# Patient Record
Sex: FEMALE | Race: WHITE | NOT HISPANIC OR LATINO | Employment: FULL TIME | URBAN - METROPOLITAN AREA
[De-identification: names, ages, dates, MRNs, and addresses within clinical notes are randomized per-mention and may not be internally consistent; named-entity substitution may affect disease eponyms.]

---

## 2017-02-07 ENCOUNTER — COMMUNICATION - HEALTHEAST (OUTPATIENT)
Dept: FAMILY MEDICINE | Facility: CLINIC | Age: 37
End: 2017-02-07

## 2017-02-07 DIAGNOSIS — F41.9 ANXIETY: ICD-10-CM

## 2017-03-03 ENCOUNTER — COMMUNICATION - HEALTHEAST (OUTPATIENT)
Dept: FAMILY MEDICINE | Facility: CLINIC | Age: 37
End: 2017-03-03

## 2017-03-06 ENCOUNTER — COMMUNICATION - HEALTHEAST (OUTPATIENT)
Dept: FAMILY MEDICINE | Facility: CLINIC | Age: 37
End: 2017-03-06

## 2017-03-06 DIAGNOSIS — F41.9 ANXIETY: ICD-10-CM

## 2017-03-06 DIAGNOSIS — F32.A DEPRESSION: ICD-10-CM

## 2017-03-06 DIAGNOSIS — Z30.9 CONTRACEPTION MANAGEMENT: ICD-10-CM

## 2017-04-10 ENCOUNTER — COMMUNICATION - HEALTHEAST (OUTPATIENT)
Dept: FAMILY MEDICINE | Facility: CLINIC | Age: 37
End: 2017-04-10

## 2017-04-10 DIAGNOSIS — F41.9 ANXIETY: ICD-10-CM

## 2017-05-26 ENCOUNTER — COMMUNICATION - HEALTHEAST (OUTPATIENT)
Dept: FAMILY MEDICINE | Facility: CLINIC | Age: 37
End: 2017-05-26

## 2017-05-26 DIAGNOSIS — F41.9 ANXIETY: ICD-10-CM

## 2017-06-15 ENCOUNTER — COMMUNICATION - HEALTHEAST (OUTPATIENT)
Dept: SURGERY | Facility: CLINIC | Age: 37
End: 2017-06-15

## 2017-06-15 ENCOUNTER — AMBULATORY - HEALTHEAST (OUTPATIENT)
Dept: SURGERY | Facility: CLINIC | Age: 37
End: 2017-06-15

## 2017-06-15 ENCOUNTER — COMMUNICATION - HEALTHEAST (OUTPATIENT)
Dept: FAMILY MEDICINE | Facility: CLINIC | Age: 37
End: 2017-06-15

## 2017-06-15 DIAGNOSIS — R63.2 HYPERPHAGIA: ICD-10-CM

## 2017-06-15 DIAGNOSIS — Z30.9 CONTRACEPTION MANAGEMENT: ICD-10-CM

## 2017-06-15 DIAGNOSIS — F32.A DEPRESSION: ICD-10-CM

## 2017-06-19 ENCOUNTER — COMMUNICATION - HEALTHEAST (OUTPATIENT)
Dept: FAMILY MEDICINE | Facility: CLINIC | Age: 37
End: 2017-06-19

## 2017-06-19 DIAGNOSIS — Z30.9 CONTRACEPTION MANAGEMENT: ICD-10-CM

## 2017-06-19 DIAGNOSIS — F32.A DEPRESSION: ICD-10-CM

## 2017-07-07 ENCOUNTER — COMMUNICATION - HEALTHEAST (OUTPATIENT)
Dept: FAMILY MEDICINE | Facility: CLINIC | Age: 37
End: 2017-07-07

## 2017-07-07 DIAGNOSIS — F41.9 ANXIETY: ICD-10-CM

## 2017-07-31 ENCOUNTER — OFFICE VISIT - HEALTHEAST (OUTPATIENT)
Dept: SURGERY | Facility: CLINIC | Age: 37
End: 2017-07-31

## 2017-07-31 DIAGNOSIS — K91.2 POSTOPERATIVE MALABSORPTION: ICD-10-CM

## 2017-07-31 DIAGNOSIS — R63.2 HYPERPHAGIA: ICD-10-CM

## 2017-07-31 DIAGNOSIS — R63.8 ABNORMAL CRAVING: ICD-10-CM

## 2017-07-31 DIAGNOSIS — Z98.84 HISTORY OF ROUX-EN-Y GASTRIC BYPASS: ICD-10-CM

## 2017-07-31 ASSESSMENT — MIFFLIN-ST. JEOR: SCORE: 1359.88

## 2017-08-15 ENCOUNTER — COMMUNICATION - HEALTHEAST (OUTPATIENT)
Dept: FAMILY MEDICINE | Facility: CLINIC | Age: 37
End: 2017-08-15

## 2017-08-15 DIAGNOSIS — F41.9 ANXIETY: ICD-10-CM

## 2017-08-30 ENCOUNTER — OFFICE VISIT - HEALTHEAST (OUTPATIENT)
Dept: FAMILY MEDICINE | Facility: CLINIC | Age: 37
End: 2017-08-30

## 2017-08-30 DIAGNOSIS — F33.41 RECURRENT MAJOR DEPRESSIVE DISORDER, IN PARTIAL REMISSION (H): ICD-10-CM

## 2017-08-30 DIAGNOSIS — F41.9 ANXIETY: ICD-10-CM

## 2017-08-30 DIAGNOSIS — F32.A DEPRESSION: ICD-10-CM

## 2017-08-30 DIAGNOSIS — K91.2 POSTOPERATIVE MALABSORPTION: ICD-10-CM

## 2017-08-30 DIAGNOSIS — Z98.84 HISTORY OF ROUX-EN-Y GASTRIC BYPASS: ICD-10-CM

## 2017-08-30 LAB
CHOLEST SERPL-MCNC: 181 MG/DL
FASTING STATUS PATIENT QL REPORTED: YES
HBA1C MFR BLD: 5.4 % (ref 3.5–6)
HDLC SERPL-MCNC: 71 MG/DL
LDLC SERPL CALC-MCNC: 100 MG/DL
TRIGL SERPL-MCNC: 52 MG/DL

## 2017-08-30 ASSESSMENT — MIFFLIN-ST. JEOR: SCORE: 1368.96

## 2017-09-01 ENCOUNTER — COMMUNICATION - HEALTHEAST (OUTPATIENT)
Dept: SURGERY | Facility: CLINIC | Age: 37
End: 2017-09-01

## 2017-09-22 ENCOUNTER — AMBULATORY - HEALTHEAST (OUTPATIENT)
Dept: SURGERY | Facility: CLINIC | Age: 37
End: 2017-09-22

## 2017-09-22 DIAGNOSIS — Z72.0 TOBACCO USE: ICD-10-CM

## 2017-11-24 ENCOUNTER — COMMUNICATION - HEALTHEAST (OUTPATIENT)
Dept: FAMILY MEDICINE | Facility: CLINIC | Age: 37
End: 2017-11-24

## 2018-01-09 ENCOUNTER — OFFICE VISIT - HEALTHEAST (OUTPATIENT)
Dept: FAMILY MEDICINE | Facility: CLINIC | Age: 38
End: 2018-01-09

## 2018-01-09 DIAGNOSIS — L73.9 FOLLICULITIS: ICD-10-CM

## 2018-01-09 DIAGNOSIS — J01.10 ACUTE NON-RECURRENT FRONTAL SINUSITIS: ICD-10-CM

## 2018-01-09 DIAGNOSIS — J01.00 ACUTE NON-RECURRENT MAXILLARY SINUSITIS: ICD-10-CM

## 2018-01-09 DIAGNOSIS — K64.4 EXTERNAL HEMORRHOID: ICD-10-CM

## 2018-01-09 ASSESSMENT — MIFFLIN-ST. JEOR: SCORE: 1387.1

## 2018-03-12 ENCOUNTER — OFFICE VISIT - HEALTHEAST (OUTPATIENT)
Dept: FAMILY MEDICINE | Facility: CLINIC | Age: 38
End: 2018-03-12

## 2018-03-12 DIAGNOSIS — H69.93 DYSFUNCTION OF BOTH EUSTACHIAN TUBES: ICD-10-CM

## 2018-04-26 ENCOUNTER — OFFICE VISIT - HEALTHEAST (OUTPATIENT)
Dept: OTOLARYNGOLOGY | Facility: CLINIC | Age: 38
End: 2018-04-26

## 2018-04-26 ENCOUNTER — OFFICE VISIT - HEALTHEAST (OUTPATIENT)
Dept: AUDIOLOGY | Facility: CLINIC | Age: 38
End: 2018-04-26

## 2018-04-26 DIAGNOSIS — G50.1 ATYPICAL FACIAL PAIN: ICD-10-CM

## 2018-04-26 DIAGNOSIS — H92.03 OTALGIA OF BOTH EARS: ICD-10-CM

## 2018-04-26 DIAGNOSIS — H93.8X3 SENSATION OF FULLNESS IN BOTH EARS: ICD-10-CM

## 2018-06-07 ENCOUNTER — COMMUNICATION - HEALTHEAST (OUTPATIENT)
Dept: FAMILY MEDICINE | Facility: CLINIC | Age: 38
End: 2018-06-07

## 2018-06-07 DIAGNOSIS — F41.9 ANXIETY: ICD-10-CM

## 2018-07-09 ENCOUNTER — OFFICE VISIT - HEALTHEAST (OUTPATIENT)
Dept: FAMILY MEDICINE | Facility: CLINIC | Age: 38
End: 2018-07-09

## 2018-07-09 DIAGNOSIS — F33.41 RECURRENT MAJOR DEPRESSIVE DISORDER, IN PARTIAL REMISSION (H): ICD-10-CM

## 2018-07-09 DIAGNOSIS — F41.1 ANXIETY STATE: ICD-10-CM

## 2018-07-09 ASSESSMENT — MIFFLIN-ST. JEOR: SCORE: 1350.81

## 2018-07-11 ENCOUNTER — COMMUNICATION - HEALTHEAST (OUTPATIENT)
Dept: PHARMACY | Facility: CLINIC | Age: 38
End: 2018-07-11

## 2018-08-13 ENCOUNTER — AMBULATORY - HEALTHEAST (OUTPATIENT)
Dept: SURGERY | Facility: CLINIC | Age: 38
End: 2018-08-13

## 2018-08-13 DIAGNOSIS — E78.5 DYSLIPIDEMIA: ICD-10-CM

## 2018-08-13 DIAGNOSIS — Z98.84 HISTORY OF ROUX-EN-Y GASTRIC BYPASS: ICD-10-CM

## 2018-08-13 DIAGNOSIS — K90.9 INTESTINAL MALABSORPTION: ICD-10-CM

## 2018-08-14 ENCOUNTER — OFFICE VISIT - HEALTHEAST (OUTPATIENT)
Dept: SURGERY | Facility: CLINIC | Age: 38
End: 2018-08-14

## 2018-08-14 DIAGNOSIS — R63.2 HYPERPHAGIA: ICD-10-CM

## 2018-08-14 DIAGNOSIS — K91.2 POSTOPERATIVE MALABSORPTION: ICD-10-CM

## 2018-08-14 DIAGNOSIS — R63.8 ABNORMAL CRAVING: ICD-10-CM

## 2018-08-14 DIAGNOSIS — Z72.0 TOBACCO USE: ICD-10-CM

## 2018-08-14 DIAGNOSIS — Z98.84 HISTORY OF ROUX-EN-Y GASTRIC BYPASS: ICD-10-CM

## 2018-08-14 RX ORDER — CETIRIZINE HYDROCHLORIDE 10 MG/1
20 TABLET ORAL DAILY
Status: SHIPPED | COMMUNITY
Start: 2018-08-14

## 2018-08-14 ASSESSMENT — MIFFLIN-ST. JEOR: SCORE: 1355.35

## 2018-09-10 ENCOUNTER — COMMUNICATION - HEALTHEAST (OUTPATIENT)
Dept: FAMILY MEDICINE | Facility: CLINIC | Age: 38
End: 2018-09-10

## 2018-09-10 DIAGNOSIS — F32.A DEPRESSION: ICD-10-CM

## 2018-09-11 RX ORDER — ALPRAZOLAM 1 MG
1 TABLET ORAL 3 TIMES DAILY PRN
Qty: 90 TABLET | Refills: 0 | Status: SHIPPED | OUTPATIENT
Start: 2018-09-11 | End: 2022-08-24

## 2018-09-28 ENCOUNTER — OFFICE VISIT - HEALTHEAST (OUTPATIENT)
Dept: FAMILY MEDICINE | Facility: CLINIC | Age: 38
End: 2018-09-28

## 2018-09-28 ENCOUNTER — AMBULATORY - HEALTHEAST (OUTPATIENT)
Dept: LAB | Facility: CLINIC | Age: 38
End: 2018-09-28

## 2018-09-28 DIAGNOSIS — E78.5 DYSLIPIDEMIA: ICD-10-CM

## 2018-09-28 DIAGNOSIS — K90.9 INTESTINAL MALABSORPTION: ICD-10-CM

## 2018-09-28 DIAGNOSIS — T07.XXXA MULTIPLE CONTUSIONS: ICD-10-CM

## 2018-09-28 DIAGNOSIS — T07.XXXA MUSCLE STRAIN, MULTIPLE SITES: ICD-10-CM

## 2018-09-28 DIAGNOSIS — Z98.84 HISTORY OF ROUX-EN-Y GASTRIC BYPASS: ICD-10-CM

## 2018-09-28 LAB
ALBUMIN SERPL-MCNC: 3.9 G/DL (ref 3.5–5)
ALP SERPL-CCNC: 136 U/L (ref 45–120)
ALT SERPL W P-5'-P-CCNC: <9 U/L (ref 0–45)
ANION GAP SERPL CALCULATED.3IONS-SCNC: 8 MMOL/L (ref 5–18)
AST SERPL W P-5'-P-CCNC: 17 U/L (ref 0–40)
BILIRUB SERPL-MCNC: 0.5 MG/DL (ref 0–1)
BUN SERPL-MCNC: 14 MG/DL (ref 8–22)
CALCIUM SERPL-MCNC: 9.5 MG/DL (ref 8.5–10.5)
CHLORIDE BLD-SCNC: 108 MMOL/L (ref 98–107)
CHOLEST SERPL-MCNC: 193 MG/DL
CO2 SERPL-SCNC: 25 MMOL/L (ref 22–31)
CREAT SERPL-MCNC: 0.64 MG/DL (ref 0.6–1.1)
ERYTHROCYTE [DISTWIDTH] IN BLOOD BY AUTOMATED COUNT: 12.1 % (ref 11–14.5)
FASTING STATUS PATIENT QL REPORTED: NO
FERRITIN SERPL-MCNC: 5 NG/ML (ref 10–130)
FOLATE SERPL-MCNC: >20 NG/ML
GFR SERPL CREATININE-BSD FRML MDRD: >60 ML/MIN/1.73M2
GLUCOSE BLD-MCNC: 74 MG/DL (ref 70–125)
HBA1C MFR BLD: 5.5 % (ref 3.5–6)
HCT VFR BLD AUTO: 36.3 % (ref 35–47)
HDLC SERPL-MCNC: 80 MG/DL
HGB BLD-MCNC: 12.4 G/DL (ref 12–16)
LDLC SERPL CALC-MCNC: 100 MG/DL
MCH RBC QN AUTO: 31 PG (ref 27–34)
MCHC RBC AUTO-ENTMCNC: 34.1 G/DL (ref 32–36)
MCV RBC AUTO: 91 FL (ref 80–100)
PLATELET # BLD AUTO: 437 THOU/UL (ref 140–440)
PMV BLD AUTO: 6.7 FL (ref 7–10)
POTASSIUM BLD-SCNC: 4.3 MMOL/L (ref 3.5–5)
PROT SERPL-MCNC: 6.7 G/DL (ref 6–8)
PTH-INTACT SERPL-MCNC: 66 PG/ML (ref 10–86)
RBC # BLD AUTO: 3.99 MILL/UL (ref 3.8–5.4)
SODIUM SERPL-SCNC: 141 MMOL/L (ref 136–145)
TRIGL SERPL-MCNC: 64 MG/DL
VIT B12 SERPL-MCNC: >2000 PG/ML (ref 213–816)
WBC: 6.1 THOU/UL (ref 4–11)

## 2018-09-28 ASSESSMENT — MIFFLIN-ST. JEOR: SCORE: 1355.35

## 2018-10-01 LAB
25(OH)D3 SERPL-MCNC: 24.5 NG/ML (ref 30–80)
ZINC SERPL-MCNC: 83 UG/DL (ref 60–120)

## 2018-10-02 LAB
ANNOTATION COMMENT IMP: NORMAL
VIT A SERPL-MCNC: 0.47 MG/L (ref 0.3–1.2)
VIT B1 PYROPHOSHATE BLD-SCNC: 128 NMOL/L (ref 70–180)
VITAMIN A (RETINYL PALMITATE): 0.02 MG/L (ref 0–0.1)

## 2018-10-07 ENCOUNTER — COMMUNICATION - HEALTHEAST (OUTPATIENT)
Dept: SURGERY | Facility: CLINIC | Age: 38
End: 2018-10-07

## 2018-11-12 ENCOUNTER — OFFICE VISIT - HEALTHEAST (OUTPATIENT)
Dept: FAMILY MEDICINE | Facility: CLINIC | Age: 38
End: 2018-11-12

## 2018-11-12 DIAGNOSIS — F90.0 ATTENTION DEFICIT HYPERACTIVITY DISORDER (ADHD), PREDOMINANTLY INATTENTIVE TYPE: ICD-10-CM

## 2018-11-12 DIAGNOSIS — F41.1 ANXIETY STATE: ICD-10-CM

## 2018-11-12 DIAGNOSIS — F33.41 RECURRENT MAJOR DEPRESSIVE DISORDER, IN PARTIAL REMISSION (H): ICD-10-CM

## 2018-11-12 ASSESSMENT — MIFFLIN-ST. JEOR: SCORE: 1291.84

## 2018-12-04 ENCOUNTER — COMMUNICATION - HEALTHEAST (OUTPATIENT)
Dept: FAMILY MEDICINE | Facility: CLINIC | Age: 38
End: 2018-12-04

## 2018-12-17 ENCOUNTER — AMBULATORY - HEALTHEAST (OUTPATIENT)
Dept: FAMILY MEDICINE | Facility: CLINIC | Age: 38
End: 2018-12-17

## 2019-01-17 ENCOUNTER — COMMUNICATION - HEALTHEAST (OUTPATIENT)
Dept: FAMILY MEDICINE | Facility: CLINIC | Age: 39
End: 2019-01-17

## 2019-02-21 ENCOUNTER — OFFICE VISIT - HEALTHEAST (OUTPATIENT)
Dept: FAMILY MEDICINE | Facility: CLINIC | Age: 39
End: 2019-02-21

## 2019-02-21 DIAGNOSIS — F33.41 RECURRENT MAJOR DEPRESSIVE DISORDER, IN PARTIAL REMISSION (H): ICD-10-CM

## 2019-02-21 DIAGNOSIS — A60.00 GENITAL HERPES SIMPLEX, UNSPECIFIED SITE: ICD-10-CM

## 2019-02-21 DIAGNOSIS — F41.1 ANXIETY STATE: ICD-10-CM

## 2019-02-21 DIAGNOSIS — Z76.89 ESTABLISHING CARE WITH NEW DOCTOR, ENCOUNTER FOR: ICD-10-CM

## 2019-02-21 ASSESSMENT — MIFFLIN-ST. JEOR: SCORE: 1355.35

## 2019-04-04 ENCOUNTER — OFFICE VISIT - HEALTHEAST (OUTPATIENT)
Dept: SURGERY | Facility: CLINIC | Age: 39
End: 2019-04-04

## 2019-04-04 DIAGNOSIS — R63.2 HYPERPHAGIA: ICD-10-CM

## 2019-04-04 DIAGNOSIS — Z72.0 TOBACCO USE: ICD-10-CM

## 2019-04-04 DIAGNOSIS — K91.2 POSTOPERATIVE MALABSORPTION: ICD-10-CM

## 2019-04-04 DIAGNOSIS — R63.8 ABNORMAL CRAVING: ICD-10-CM

## 2019-04-04 ASSESSMENT — MIFFLIN-ST. JEOR: SCORE: 1359.88

## 2019-04-10 ENCOUNTER — COMMUNICATION - HEALTHEAST (OUTPATIENT)
Dept: FAMILY MEDICINE | Facility: CLINIC | Age: 39
End: 2019-04-10

## 2019-04-16 ENCOUNTER — OFFICE VISIT - HEALTHEAST (OUTPATIENT)
Dept: FAMILY MEDICINE | Facility: CLINIC | Age: 39
End: 2019-04-16

## 2019-04-16 DIAGNOSIS — Z12.4 SCREENING FOR MALIGNANT NEOPLASM OF CERVIX: ICD-10-CM

## 2019-04-16 DIAGNOSIS — N76.0 ACUTE VAGINITIS: ICD-10-CM

## 2019-04-16 LAB
CLUE CELLS: NORMAL
TRICHOMONAS, WET PREP: NORMAL
YEAST, WET PREP: NORMAL

## 2019-04-16 ASSESSMENT — MIFFLIN-ST. JEOR: SCORE: 1364.42

## 2019-04-17 ENCOUNTER — AMBULATORY - HEALTHEAST (OUTPATIENT)
Dept: FAMILY MEDICINE | Facility: CLINIC | Age: 39
End: 2019-04-17

## 2019-04-17 DIAGNOSIS — N76.0 ACUTE VAGINITIS: ICD-10-CM

## 2019-04-17 LAB
C TRACH DNA SPEC QL PROBE+SIG AMP: NEGATIVE
HPV SOURCE: NORMAL
HUMAN PAPILLOMA VIRUS 16 DNA: NEGATIVE
HUMAN PAPILLOMA VIRUS 18 DNA: NEGATIVE
HUMAN PAPILLOMA VIRUS FINAL DIAGNOSIS: NORMAL
HUMAN PAPILLOMA VIRUS OTHER HR: NEGATIVE
N GONORRHOEA DNA SPEC QL NAA+PROBE: NEGATIVE
SPECIMEN DESCRIPTION: NORMAL

## 2019-04-23 ENCOUNTER — COMMUNICATION - HEALTHEAST (OUTPATIENT)
Dept: FAMILY MEDICINE | Facility: CLINIC | Age: 39
End: 2019-04-23

## 2019-04-23 LAB
BKR LAB AP ABNORMAL BLEEDING: NO
BKR LAB AP BIRTH CONTROL/HORMONES: NORMAL
BKR LAB AP CERVICAL APPEARANCE: NORMAL
BKR LAB AP GYN ADEQUACY: NORMAL
BKR LAB AP GYN INTERPRETATION: NORMAL
BKR LAB AP HPV REFLEX: NORMAL
BKR LAB AP LMP: NORMAL
BKR LAB AP PATIENT STATUS: NORMAL
BKR LAB AP PREVIOUS ABNORMAL: NORMAL
BKR LAB AP PREVIOUS NORMAL: 2016
HIGH RISK?: NO
PATH REPORT.COMMENTS IMP SPEC: NORMAL
RESULT FLAG (HE HISTORICAL CONVERSION): NORMAL

## 2019-06-13 ENCOUNTER — COMMUNICATION - HEALTHEAST (OUTPATIENT)
Dept: FAMILY MEDICINE | Facility: CLINIC | Age: 39
End: 2019-06-13

## 2019-06-13 DIAGNOSIS — N76.0 ACUTE VAGINITIS: ICD-10-CM

## 2019-06-14 ENCOUNTER — COMMUNICATION - HEALTHEAST (OUTPATIENT)
Dept: FAMILY MEDICINE | Facility: CLINIC | Age: 39
End: 2019-06-14

## 2019-06-14 DIAGNOSIS — F32.A DEPRESSION: ICD-10-CM

## 2019-07-25 ENCOUNTER — COMMUNICATION - HEALTHEAST (OUTPATIENT)
Dept: FAMILY MEDICINE | Facility: CLINIC | Age: 39
End: 2019-07-25

## 2019-07-25 DIAGNOSIS — N76.0 ACUTE VAGINITIS: ICD-10-CM

## 2019-08-26 ENCOUNTER — COMMUNICATION - HEALTHEAST (OUTPATIENT)
Dept: SURGERY | Facility: CLINIC | Age: 39
End: 2019-08-26

## 2019-08-26 DIAGNOSIS — Z98.84 HISTORY OF ROUX-EN-Y GASTRIC BYPASS: ICD-10-CM

## 2019-09-04 ENCOUNTER — OFFICE VISIT - HEALTHEAST (OUTPATIENT)
Dept: FAMILY MEDICINE | Facility: CLINIC | Age: 39
End: 2019-09-04

## 2019-09-04 DIAGNOSIS — H00.14 CHALAZION OF LEFT UPPER EYELID: ICD-10-CM

## 2019-09-04 ASSESSMENT — MIFFLIN-ST. JEOR: SCORE: 1384.83

## 2019-09-05 ENCOUNTER — COMMUNICATION - HEALTHEAST (OUTPATIENT)
Dept: FAMILY MEDICINE | Facility: CLINIC | Age: 39
End: 2019-09-05

## 2019-09-05 DIAGNOSIS — F41.9 ANXIETY: ICD-10-CM

## 2019-09-05 DIAGNOSIS — B37.31 YEAST VAGINITIS: ICD-10-CM

## 2019-09-05 DIAGNOSIS — F41.1 ANXIETY STATE: ICD-10-CM

## 2019-09-16 ENCOUNTER — COMMUNICATION - HEALTHEAST (OUTPATIENT)
Dept: FAMILY MEDICINE | Facility: CLINIC | Age: 39
End: 2019-09-16

## 2019-09-16 DIAGNOSIS — N76.0 ACUTE VAGINITIS: ICD-10-CM

## 2019-10-06 ENCOUNTER — COMMUNICATION - HEALTHEAST (OUTPATIENT)
Dept: FAMILY MEDICINE | Facility: CLINIC | Age: 39
End: 2019-10-06

## 2019-10-06 DIAGNOSIS — F41.9 ANXIETY: ICD-10-CM

## 2019-10-07 RX ORDER — HYDROXYZINE HYDROCHLORIDE 25 MG/1
25 TABLET, FILM COATED ORAL DAILY PRN
Qty: 90 TABLET | Refills: 3 | Status: SHIPPED | OUTPATIENT
Start: 2019-10-07 | End: 2023-11-02

## 2019-10-31 ENCOUNTER — OFFICE VISIT - HEALTHEAST (OUTPATIENT)
Dept: SURGERY | Facility: CLINIC | Age: 39
End: 2019-10-31

## 2019-10-31 ENCOUNTER — AMBULATORY - HEALTHEAST (OUTPATIENT)
Dept: LAB | Facility: CLINIC | Age: 39
End: 2019-10-31

## 2019-10-31 DIAGNOSIS — R63.8 ABNORMAL CRAVING: ICD-10-CM

## 2019-10-31 DIAGNOSIS — K91.2 POSTOPERATIVE MALABSORPTION: ICD-10-CM

## 2019-10-31 DIAGNOSIS — Z98.84 HISTORY OF ROUX-EN-Y GASTRIC BYPASS: ICD-10-CM

## 2019-10-31 DIAGNOSIS — R63.2 HYPERPHAGIA: ICD-10-CM

## 2019-10-31 LAB
ALBUMIN SERPL-MCNC: 3.9 G/DL (ref 3.5–5)
ALP SERPL-CCNC: 152 U/L (ref 45–120)
ALT SERPL W P-5'-P-CCNC: 15 U/L (ref 0–45)
ANION GAP SERPL CALCULATED.3IONS-SCNC: 11 MMOL/L (ref 5–18)
AST SERPL W P-5'-P-CCNC: 33 U/L (ref 0–40)
BILIRUB SERPL-MCNC: 0.3 MG/DL (ref 0–1)
BUN SERPL-MCNC: 11 MG/DL (ref 8–22)
CALCIUM SERPL-MCNC: 9.6 MG/DL (ref 8.5–10.5)
CHLORIDE BLD-SCNC: 107 MMOL/L (ref 98–107)
CO2 SERPL-SCNC: 23 MMOL/L (ref 22–31)
CREAT SERPL-MCNC: 0.7 MG/DL (ref 0.6–1.1)
ERYTHROCYTE [DISTWIDTH] IN BLOOD BY AUTOMATED COUNT: 14.8 % (ref 11–14.5)
FERRITIN SERPL-MCNC: 4 NG/ML (ref 10–130)
FOLATE SERPL-MCNC: >20 NG/ML
GFR SERPL CREATININE-BSD FRML MDRD: >60 ML/MIN/1.73M2
GLUCOSE BLD-MCNC: 85 MG/DL (ref 70–125)
HBA1C MFR BLD: 5.5 % (ref 3.5–6)
HCT VFR BLD AUTO: 29.1 % (ref 35–47)
HGB BLD-MCNC: 9.7 G/DL (ref 12–16)
MCH RBC QN AUTO: 25.3 PG (ref 27–34)
MCHC RBC AUTO-ENTMCNC: 33.1 G/DL (ref 32–36)
MCV RBC AUTO: 76 FL (ref 80–100)
PLATELET # BLD AUTO: 583 THOU/UL (ref 140–440)
PMV BLD AUTO: 6.4 FL (ref 7–10)
POTASSIUM BLD-SCNC: 4.5 MMOL/L (ref 3.5–5)
PROT SERPL-MCNC: 7 G/DL (ref 6–8)
PTH-INTACT SERPL-MCNC: 103 PG/ML (ref 10–86)
RBC # BLD AUTO: 3.81 MILL/UL (ref 3.8–5.4)
SODIUM SERPL-SCNC: 141 MMOL/L (ref 136–145)
VIT B12 SERPL-MCNC: >2000 PG/ML (ref 213–816)
WBC: 6.2 THOU/UL (ref 4–11)

## 2019-10-31 RX ORDER — NALTREXONE HYDROCHLORIDE 50 MG/1
25 TABLET, FILM COATED ORAL 2 TIMES DAILY
Qty: 90 TABLET | Status: SHIPPED | OUTPATIENT
Start: 2019-10-31 | End: 2022-08-24

## 2019-10-31 ASSESSMENT — MIFFLIN-ST. JEOR: SCORE: 1387.1

## 2019-11-01 LAB — 25(OH)D3 SERPL-MCNC: 16 NG/ML (ref 30–80)

## 2019-11-03 LAB
ANNOTATION COMMENT IMP: NORMAL
VIT A SERPL-MCNC: 0.49 MG/L (ref 0.3–1.2)
VITAMIN A (RETINYL PALMITATE): 0.03 MG/L (ref 0–0.1)
ZINC SERPL-MCNC: 75.3 UG/DL (ref 60–120)

## 2019-11-05 ENCOUNTER — COMMUNICATION - HEALTHEAST (OUTPATIENT)
Dept: SURGERY | Facility: CLINIC | Age: 39
End: 2019-11-05

## 2019-11-06 LAB — VIT B1 PYROPHOSHATE BLD-SCNC: 92 NMOL/L (ref 70–180)

## 2019-11-08 ENCOUNTER — COMMUNICATION - HEALTHEAST (OUTPATIENT)
Dept: FAMILY MEDICINE | Facility: CLINIC | Age: 39
End: 2019-11-08

## 2019-11-08 DIAGNOSIS — F41.1 ANXIETY STATE: ICD-10-CM

## 2019-11-12 ENCOUNTER — COMMUNICATION - HEALTHEAST (OUTPATIENT)
Dept: SURGERY | Facility: CLINIC | Age: 39
End: 2019-11-12

## 2020-02-12 ENCOUNTER — COMMUNICATION - HEALTHEAST (OUTPATIENT)
Dept: FAMILY MEDICINE | Facility: CLINIC | Age: 40
End: 2020-02-12

## 2020-04-08 ENCOUNTER — COMMUNICATION - HEALTHEAST (OUTPATIENT)
Dept: SURGERY | Facility: CLINIC | Age: 40
End: 2020-04-08

## 2020-04-08 DIAGNOSIS — K90.9 INTESTINAL MALABSORPTION: ICD-10-CM

## 2020-04-08 DIAGNOSIS — D64.9 ANEMIA: ICD-10-CM

## 2020-04-13 ENCOUNTER — COMMUNICATION - HEALTHEAST (OUTPATIENT)
Dept: ADMINISTRATIVE | Facility: CLINIC | Age: 40
End: 2020-04-13

## 2020-04-14 ENCOUNTER — COMMUNICATION - HEALTHEAST (OUTPATIENT)
Dept: SURGERY | Facility: CLINIC | Age: 40
End: 2020-04-14

## 2020-04-14 DIAGNOSIS — E55.9 VITAMIN D DEFICIENCY: ICD-10-CM

## 2020-04-14 DIAGNOSIS — Z98.84 HISTORY OF ROUX-EN-Y GASTRIC BYPASS: ICD-10-CM

## 2020-04-14 DIAGNOSIS — R79.89 ELEVATED PARATHYROID HORMONE: ICD-10-CM

## 2020-04-14 DIAGNOSIS — K91.2 POSTOPERATIVE MALABSORPTION: ICD-10-CM

## 2020-04-14 DIAGNOSIS — E56.9 VITAMIN DEFICIENCY: ICD-10-CM

## 2020-04-14 DIAGNOSIS — R79.0 LOW FERRITIN: ICD-10-CM

## 2020-04-16 ENCOUNTER — AMBULATORY - HEALTHEAST (OUTPATIENT)
Dept: LAB | Facility: CLINIC | Age: 40
End: 2020-04-16

## 2020-04-16 DIAGNOSIS — R79.0 LOW FERRITIN: ICD-10-CM

## 2020-04-16 DIAGNOSIS — K91.2 POSTOPERATIVE MALABSORPTION: ICD-10-CM

## 2020-04-16 DIAGNOSIS — Z98.84 HISTORY OF ROUX-EN-Y GASTRIC BYPASS: ICD-10-CM

## 2020-04-16 DIAGNOSIS — R79.89 ELEVATED PARATHYROID HORMONE: ICD-10-CM

## 2020-04-16 DIAGNOSIS — E55.9 VITAMIN D DEFICIENCY: ICD-10-CM

## 2020-04-16 LAB
ALBUMIN SERPL-MCNC: 4 G/DL (ref 3.5–5)
ALP SERPL-CCNC: 113 U/L (ref 45–120)
ALT SERPL W P-5'-P-CCNC: 12 U/L (ref 0–45)
ANION GAP SERPL CALCULATED.3IONS-SCNC: 12 MMOL/L (ref 5–18)
AST SERPL W P-5'-P-CCNC: 21 U/L (ref 0–40)
BILIRUB SERPL-MCNC: 1 MG/DL (ref 0–1)
BUN SERPL-MCNC: 10 MG/DL (ref 8–22)
CALCIUM SERPL-MCNC: 9.4 MG/DL (ref 8.5–10.5)
CHLORIDE BLD-SCNC: 107 MMOL/L (ref 98–107)
CO2 SERPL-SCNC: 22 MMOL/L (ref 22–31)
CREAT SERPL-MCNC: 0.66 MG/DL (ref 0.6–1.1)
ERYTHROCYTE [DISTWIDTH] IN BLOOD BY AUTOMATED COUNT: 11.1 % (ref 11–14.5)
FERRITIN SERPL-MCNC: 28 NG/ML (ref 10–130)
GFR SERPL CREATININE-BSD FRML MDRD: >60 ML/MIN/1.73M2
GLUCOSE BLD-MCNC: 84 MG/DL (ref 70–125)
HCT VFR BLD AUTO: 41.3 % (ref 35–47)
HGB BLD-MCNC: 14.2 G/DL (ref 12–16)
MCH RBC QN AUTO: 34.2 PG (ref 27–34)
MCHC RBC AUTO-ENTMCNC: 34.3 G/DL (ref 32–36)
MCV RBC AUTO: 100 FL (ref 80–100)
PLATELET # BLD AUTO: 429 THOU/UL (ref 140–440)
PMV BLD AUTO: 7 FL (ref 7–10)
POTASSIUM BLD-SCNC: 3.8 MMOL/L (ref 3.5–5)
PROT SERPL-MCNC: 6.8 G/DL (ref 6–8)
PTH-INTACT SERPL-MCNC: 44 PG/ML (ref 10–86)
RBC # BLD AUTO: 4.14 MILL/UL (ref 3.8–5.4)
SODIUM SERPL-SCNC: 141 MMOL/L (ref 136–145)
WBC: 7 THOU/UL (ref 4–11)

## 2020-04-17 ENCOUNTER — COMMUNICATION - HEALTHEAST (OUTPATIENT)
Dept: SURGERY | Facility: CLINIC | Age: 40
End: 2020-04-17

## 2020-04-17 LAB — 25(OH)D3 SERPL-MCNC: 39.4 NG/ML (ref 30–80)

## 2020-04-23 ENCOUNTER — COMMUNICATION - HEALTHEAST (OUTPATIENT)
Dept: SURGERY | Facility: CLINIC | Age: 40
End: 2020-04-23

## 2020-04-23 ENCOUNTER — OFFICE VISIT - HEALTHEAST (OUTPATIENT)
Dept: SURGERY | Facility: CLINIC | Age: 40
End: 2020-04-23

## 2020-04-23 DIAGNOSIS — K91.2 POSTOPERATIVE MALABSORPTION: ICD-10-CM

## 2020-04-23 DIAGNOSIS — Z72.0 TOBACCO USE: ICD-10-CM

## 2020-04-23 RX ORDER — FERROUS SULFATE 325(65) MG
1 TABLET ORAL
Status: SHIPPED | COMMUNITY
Start: 2020-04-23

## 2020-04-23 ASSESSMENT — MIFFLIN-ST. JEOR: SCORE: 1409.78

## 2020-05-26 ENCOUNTER — COMMUNICATION - HEALTHEAST (OUTPATIENT)
Dept: FAMILY MEDICINE | Facility: CLINIC | Age: 40
End: 2020-05-26

## 2020-05-26 DIAGNOSIS — F41.1 ANXIETY STATE: ICD-10-CM

## 2020-05-26 RX ORDER — VENLAFAXINE HYDROCHLORIDE 150 MG/1
1 TABLET, EXTENDED RELEASE ORAL DAILY
Qty: 90 EACH | Refills: 3 | Status: SHIPPED | OUTPATIENT
Start: 2020-05-26 | End: 2022-08-24

## 2020-06-16 ENCOUNTER — AMBULATORY - HEALTHEAST (OUTPATIENT)
Dept: SURGERY | Facility: CLINIC | Age: 40
End: 2020-06-16

## 2020-06-16 ENCOUNTER — COMMUNICATION - HEALTHEAST (OUTPATIENT)
Dept: SURGERY | Facility: CLINIC | Age: 40
End: 2020-06-16

## 2020-06-16 DIAGNOSIS — R63.2 HYPERPHAGIA: ICD-10-CM

## 2020-06-16 RX ORDER — PHENTERMINE HYDROCHLORIDE 37.5 MG/1
TABLET ORAL
Qty: 90 TABLET | Refills: 1 | Status: SHIPPED | OUTPATIENT
Start: 2020-06-16 | End: 2022-08-24

## 2020-06-17 ENCOUNTER — COMMUNICATION - HEALTHEAST (OUTPATIENT)
Dept: FAMILY MEDICINE | Facility: CLINIC | Age: 40
End: 2020-06-17

## 2020-06-17 DIAGNOSIS — F32.A DEPRESSION: ICD-10-CM

## 2020-06-23 ENCOUNTER — COMMUNICATION - HEALTHEAST (OUTPATIENT)
Dept: FAMILY MEDICINE | Facility: CLINIC | Age: 40
End: 2020-06-23

## 2020-06-29 ENCOUNTER — COMMUNICATION - HEALTHEAST (OUTPATIENT)
Dept: FAMILY MEDICINE | Facility: CLINIC | Age: 40
End: 2020-06-29

## 2020-06-29 DIAGNOSIS — F32.A DEPRESSION: ICD-10-CM

## 2020-06-29 RX ORDER — BUSPIRONE HYDROCHLORIDE 15 MG/1
15 TABLET ORAL 2 TIMES DAILY
Qty: 15 TABLET | Refills: 0 | Status: SHIPPED | OUTPATIENT
Start: 2020-06-29 | End: 2022-07-13

## 2020-07-24 ENCOUNTER — OFFICE VISIT - HEALTHEAST (OUTPATIENT)
Dept: FAMILY MEDICINE | Facility: CLINIC | Age: 40
End: 2020-07-24

## 2020-07-24 ENCOUNTER — COMMUNICATION - HEALTHEAST (OUTPATIENT)
Dept: FAMILY MEDICINE | Facility: CLINIC | Age: 40
End: 2020-07-24

## 2020-07-24 DIAGNOSIS — N76.0 BACTERIAL VAGINOSIS: ICD-10-CM

## 2020-07-24 DIAGNOSIS — B96.89 BACTERIAL VAGINOSIS: ICD-10-CM

## 2020-07-24 DIAGNOSIS — Z11.3 SCREEN FOR STD (SEXUALLY TRANSMITTED DISEASE): ICD-10-CM

## 2020-07-24 LAB
CLUE CELLS: ABNORMAL
HIV 1+2 AB+HIV1 P24 AG SERPL QL IA: NEGATIVE
TRICHOMONAS, WET PREP: ABNORMAL
YEAST, WET PREP: ABNORMAL

## 2020-07-24 ASSESSMENT — MIFFLIN-ST. JEOR: SCORE: 1405.7

## 2020-07-25 LAB — T PALLIDUM AB SER QL: NEGATIVE

## 2020-07-28 LAB
C TRACH DNA SPEC QL PROBE+SIG AMP: NEGATIVE
N GONORRHOEA DNA SPEC QL NAA+PROBE: NEGATIVE

## 2020-07-29 ENCOUNTER — COMMUNICATION - HEALTHEAST (OUTPATIENT)
Dept: FAMILY MEDICINE | Facility: CLINIC | Age: 40
End: 2020-07-29

## 2020-09-17 ENCOUNTER — OFFICE VISIT - HEALTHEAST (OUTPATIENT)
Dept: SURGERY | Facility: CLINIC | Age: 40
End: 2020-09-17

## 2020-09-17 ENCOUNTER — AMBULATORY - HEALTHEAST (OUTPATIENT)
Dept: LAB | Facility: CLINIC | Age: 40
End: 2020-09-17

## 2020-09-17 DIAGNOSIS — K91.2 POSTOPERATIVE MALABSORPTION: ICD-10-CM

## 2020-09-17 LAB
ALBUMIN SERPL-MCNC: 4.2 G/DL (ref 3.5–5)
ALP SERPL-CCNC: 130 U/L (ref 45–120)
ALT SERPL W P-5'-P-CCNC: <9 U/L (ref 0–45)
ANION GAP SERPL CALCULATED.3IONS-SCNC: 10 MMOL/L (ref 5–18)
AST SERPL W P-5'-P-CCNC: 23 U/L (ref 0–40)
BILIRUB SERPL-MCNC: 0.2 MG/DL (ref 0–1)
BUN SERPL-MCNC: 9 MG/DL (ref 8–22)
CALCIUM SERPL-MCNC: 9.1 MG/DL (ref 8.5–10.5)
CHLORIDE BLD-SCNC: 105 MMOL/L (ref 98–107)
CO2 SERPL-SCNC: 25 MMOL/L (ref 22–31)
CREAT SERPL-MCNC: 0.71 MG/DL (ref 0.6–1.1)
ERYTHROCYTE [DISTWIDTH] IN BLOOD BY AUTOMATED COUNT: 10.3 % (ref 11–14.5)
FERRITIN SERPL-MCNC: 54 NG/ML (ref 10–130)
FOLATE SERPL-MCNC: >20 NG/ML
GFR SERPL CREATININE-BSD FRML MDRD: >60 ML/MIN/1.73M2
GLUCOSE BLD-MCNC: 117 MG/DL (ref 70–125)
HBA1C MFR BLD: 4.8 %
HCT VFR BLD AUTO: 43.1 % (ref 35–47)
HGB BLD-MCNC: 14.6 G/DL (ref 12–16)
MCH RBC QN AUTO: 33.8 PG (ref 27–34)
MCHC RBC AUTO-ENTMCNC: 33.9 G/DL (ref 32–36)
MCV RBC AUTO: 100 FL (ref 80–100)
PLATELET # BLD AUTO: 413 THOU/UL (ref 140–440)
PMV BLD AUTO: 7.4 FL (ref 7–10)
POTASSIUM BLD-SCNC: 3.9 MMOL/L (ref 3.5–5)
PROT SERPL-MCNC: 6.8 G/DL (ref 6–8)
PTH-INTACT SERPL-MCNC: 58 PG/ML (ref 10–86)
RBC # BLD AUTO: 4.33 MILL/UL (ref 3.8–5.4)
SODIUM SERPL-SCNC: 140 MMOL/L (ref 136–145)
VIT B12 SERPL-MCNC: >2000 PG/ML (ref 213–816)
WBC: 7.5 THOU/UL (ref 4–11)

## 2020-09-17 ASSESSMENT — MIFFLIN-ST. JEOR: SCORE: 1409.78

## 2020-09-18 LAB — 25(OH)D3 SERPL-MCNC: 43.4 NG/ML (ref 30–80)

## 2020-09-22 LAB
ANNOTATION COMMENT IMP: NORMAL
VIT A SERPL-MCNC: 0.47 MG/L (ref 0.3–1.2)
VITAMIN A (RETINYL PALMITATE): 0.03 MG/L (ref 0–0.1)

## 2020-09-23 ENCOUNTER — COMMUNICATION - HEALTHEAST (OUTPATIENT)
Dept: SURGERY | Facility: CLINIC | Age: 40
End: 2020-09-23

## 2020-09-25 ENCOUNTER — OFFICE VISIT - HEALTHEAST (OUTPATIENT)
Dept: FAMILY MEDICINE | Facility: CLINIC | Age: 40
End: 2020-09-25

## 2020-09-25 DIAGNOSIS — N73.9 PELVIC INFLAMMATORY DISEASE: ICD-10-CM

## 2020-09-25 RX ORDER — AZITHROMYCIN 500 MG/1
TABLET, FILM COATED ORAL
Qty: 4 TABLET | Refills: 0 | Status: SHIPPED | OUTPATIENT
Start: 2020-09-25 | End: 2022-08-24

## 2020-09-25 ASSESSMENT — MIFFLIN-ST. JEOR: SCORE: 1366.3

## 2020-09-25 ASSESSMENT — PATIENT HEALTH QUESTIONNAIRE - PHQ9: SUM OF ALL RESPONSES TO PHQ QUESTIONS 1-9: 10

## 2020-10-05 LAB
VIT B1 PYROPHOSHATE BLD-SCNC: 152 NMOL/L (ref 70–180)
ZINC SERPL-MCNC: 85.2 UG/DL (ref 60–120)

## 2020-12-02 ENCOUNTER — COMMUNICATION - HEALTHEAST (OUTPATIENT)
Dept: SURGERY | Facility: CLINIC | Age: 40
End: 2020-12-02

## 2020-12-02 DIAGNOSIS — Z98.84 HISTORY OF ROUX-EN-Y GASTRIC BYPASS: ICD-10-CM

## 2020-12-02 RX ORDER — OMEPRAZOLE 40 MG/1
CAPSULE, DELAYED RELEASE ORAL
Qty: 90 CAPSULE | Status: SHIPPED | OUTPATIENT
Start: 2020-12-02 | End: 2021-12-02

## 2021-05-22 ENCOUNTER — RECORDS - HEALTHEAST (OUTPATIENT)
Dept: ADMINISTRATIVE | Facility: OTHER | Age: 41
End: 2021-05-22

## 2021-05-26 ENCOUNTER — RECORDS - HEALTHEAST (OUTPATIENT)
Dept: ADMINISTRATIVE | Facility: CLINIC | Age: 41
End: 2021-05-26

## 2021-05-26 ASSESSMENT — PATIENT HEALTH QUESTIONNAIRE - PHQ9: SUM OF ALL RESPONSES TO PHQ QUESTIONS 1-9: 10

## 2021-05-27 ENCOUNTER — RECORDS - HEALTHEAST (OUTPATIENT)
Dept: ADMINISTRATIVE | Facility: CLINIC | Age: 41
End: 2021-05-27

## 2021-05-27 NOTE — TELEPHONE ENCOUNTER
Called and spoke to pt about follow-up appt for asthma.  Pt states that she has asked to have this removed from her chart, as she does NOT have asthma.

## 2021-05-27 NOTE — PROGRESS NOTES
ASSESMENT AND PLAN:  Diagnoses and all orders for this visit:    Acute vaginitis  -     Wet Prep, Vaginal done today is negative, will await the below lab results and then call the patient and let her know about her final lab report and then decide on whether any additional treatment should be completed.  I did recommend she use a probiotic.  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  She did the over-the-counter yeast treatment and her symptoms have improved but not resolved.  She is concerned about possible new sexual exposure and would like to be checked for gonorrhea and chlamydia.  Her last Pap smear was in 2016 and was normal.  Given the new sexual exposure I recommended that that be rechecked today since we are doing the exam anyway and the patient is in agreement.  I offered her HIV and syphilis testing as well but she does not want to do that testing.  She has not been having any fevers or abdominal pain or dysuria.  No recent antibiotics.    Past Medical History:   Diagnosis Date     Anal fissure      Anemia      Anxiety      Depression      External hemorrhoids without mention of complication      Herpes simplex     genital, last outbreak around age 25     Migraine, unspecified, without mention of intractable migraine without mention of status migrainosus      Other and unspecified postsurgical nonabsorption 6/10/2015     Tobacco use 6/10/2015     Patient Active Problem List   Diagnosis     Recurrent major depressive disorder, in partial remission (H)     Herpes Simplex Type II     Anxiety     Migraine Headache     Other and unspecified postsurgical nonabsorption     Tobacco use     Current Outpatient Medications  "  Medication Sig Dispense Refill     busPIRone (BUSPAR) 15 MG tablet TAKE ONE TABLET BY MOUTH TWICE A  tablet 2     cetirizine (ZYRTEC) 10 MG tablet Take 20 mg by mouth daily.       omeprazole (PRILOSEC) 20 MG capsule Take 1 capsule (20 mg total) by mouth daily. 90 capsule prn     phentermine (ADIPEX-P) 37.5 mg tablet Take 1/2 to 1 tablet in the morning. 90 tablet 1     venlafaxine 150 mg TR24 Take 150 mg by mouth daily. 30 each 11     ALPRAZolam (XANAX) 1 MG tablet Take 1 tablet (1 mg total) by mouth 3 (three) times a day as needed for anxiety. 90 tablet 0     mupirocin (BACTROBAN) 2 % cream APPLY TOPICALLY TO THE AFFECTED AREA(S) THREE TIMES DAILY. 30 g 5     naltrexone (DEPADE) 50 mg tablet Take 0.5 tablets (25 mg total) by mouth 2 (two) times a day. 90 tablet prn     No current facility-administered medications for this visit.      Social History     Tobacco Use   Smoking Status Current Every Day Smoker     Packs/day: 0.50     Start date: 3/18/2015   Smokeless Tobacco Never Used       OBJECTICE: /60 (Patient Site: Left Arm, Patient Position: Sitting, Cuff Size: Adult Regular)   Pulse 88   Temp 98.1  F (36.7  C) (Oral)   Resp 16   Ht 5' 5\" (1.651 m)   Wt 154 lb (69.9 kg)   LMP 04/06/2019   SpO2 96%   Breastfeeding? No   BMI 25.63 kg/m       Recent Results (from the past 24 hour(s))   Wet Prep, Vaginal    Collection Time: 04/16/19  4:59 PM   Result Value Ref Range    Yeast Result No yeast seen No yeast seen    Trichomonas No Trichomonas seen No Trichomonas seen    Clue Cells, Wet Prep No Clue cells seen No Clue cells seen        GEN-alert, appropriate, in no apparent distress   Genitourinary-normal appearance to the external genitalia, vaginal mucosa appears normal, thin white discharge is present, cervix appears normal.    Sunny Cowart"

## 2021-05-27 NOTE — PROGRESS NOTES
"4.5 yrs s/p RYGB here for phenermine and naltrexone refill    HPI. 90# down from initial weight of 243# . RYGB Dr. Celestin  9/15/2014.  Water retention lately. Smoking 1/2 pd d/t stress with living situation. Looking for a house.  Would like refill of phentermine as has been snacking more.  Eating 3 meals typically. B: 2 granola bars or one and egg L: Frozen meal D: Frozen meal if working or cook at home.  Protein first: yes then veggies   water from meals: yes  Occasional Sprite  Restaurant eating: <2 X/wk  INSULTS: Rare ETOH, 1/2 PPD tobacco CAFFEINE: occ NSAIDS: none    Exercise Routine: walks outside at work 3-4X/wk. No strength training    DEXA: not yet    O:     Height: 5' 5\" (1.651 m) (4/4/2019  1:25 PM)  Weight: 153 lb (69.4 kg) (4/4/2019  1:25 PM)  BMI (Calculated): 25.5 (4/4/2019  1:25 PM)  SpO2: 100 % (4/4/2019  1:25 PM)      A: RYGB 4.5 yrs doing well. Cravings. Water retention. Tobacco use.      P: Refill phentermine and naltrexone.  Avoid packages, cans, boxed foods/beverages., compression stockings, elevation, food diary will shed light on LE edema  Offered call I quits. Declined. Has Chantix    Time 15 minutes 50% with counseling  "

## 2021-05-27 NOTE — PATIENT INSTRUCTIONS - HE
Avoid packages, cans, boxed foods carbonated beverages., compression stockings, elevation, food diary will shed light on LE edema  Let us know if we can help you with tobacco cessation.  ELVIA [phone] Call to schedule

## 2021-05-28 ASSESSMENT — ASTHMA QUESTIONNAIRES: ACT_TOTALSCORE: 25

## 2021-05-29 NOTE — TELEPHONE ENCOUNTER
RN cannot approve Refill Request    RN can NOT refill this medication med is not covered by policy/route to provider.    Nickolas Cullen, [facility] Triage/Med Refill 6/13/2019    Requested Prescriptions   Pending Prescriptions Disp Refills     fluconazole (DIFLUCAN) 150 MG tablet [Pharmacy Med Name: FLUCONAZOLE 150MG TABLETS] 1 tablet 0     Sig: TAKE 1 TABLET(150 MG) BY MOUTH 1 TIME FOR 1 DOSE       There is no refill protocol information for this order

## 2021-05-29 NOTE — TELEPHONE ENCOUNTER
Refill Approved    Rx renewed per Medication Renewal Policy. Medication was last renewed on 9/11/2018 .         Patrick Crooks, [facility] Connection Triage/Med Refill 6/14/2019     Requested Prescriptions   Pending Prescriptions Disp Refills     busPIRone (BUSPAR) 15 MG tablet 180 tablet 2     Sig: Take 1 tablet (15 mg total) by mouth 2 (two) times a day.       Tricyclics/Misc Antidepressant/Antianxiety Meds Refill Protocol Passed - 6/14/2019 12:52 PM        Passed - PCP or prescribing provider visit in last year     Last office visit with prescriber/PCP: 2/21/2019 Colette Anand MD OR same dept: 4/16/2019 Sunny Cowart MD OR same specialty: 4/16/2019 Sunny Cowart MD  Last physical: Visit date not found Last MTM visit: Visit date not found   Next visit within 3 mo: Visit date not found  Next physical within 3 mo: Visit date not found  Prescriber OR PCP: Colette Anand MD  Last diagnosis associated with med order: 1. Depression  - busPIRone (BUSPAR) 15 MG tablet; Take 1 tablet (15 mg total) by mouth 2 (two) times a day.  Dispense: 180 tablet; Refill: 2    If protocol passes may refill for 12 months if within 3 months of last provider visit (or a total of 15 months).

## 2021-05-30 ENCOUNTER — RECORDS - HEALTHEAST (OUTPATIENT)
Dept: ADMINISTRATIVE | Facility: CLINIC | Age: 41
End: 2021-05-30

## 2021-05-30 NOTE — TELEPHONE ENCOUNTER
RN cannot approve Refill Request    RN can NOT refill this medication med is not covered by policy/route to provider.    Nickolas Cullen, [facility] Triage/Med Refill 7/25/2019    Requested Prescriptions   Pending Prescriptions Disp Refills     fluconazole (DIFLUCAN) 150 MG tablet [Pharmacy Med Name: FLUCONAZOLE 150MG TABLETS] 1 tablet 0     Sig: TAKE 1 TABLET(150 MG) BY MOUTH 1 TIME FOR 1 DOSE       There is no refill protocol information for this order

## 2021-05-31 ENCOUNTER — RECORDS - HEALTHEAST (OUTPATIENT)
Dept: ADMINISTRATIVE | Facility: CLINIC | Age: 41
End: 2021-05-31

## 2021-05-31 VITALS — WEIGHT: 155 LBS | HEIGHT: 65 IN | BODY MASS INDEX: 25.83 KG/M2

## 2021-05-31 VITALS — WEIGHT: 153 LBS | BODY MASS INDEX: 25.49 KG/M2 | HEIGHT: 65 IN

## 2021-05-31 VITALS — HEIGHT: 65 IN | BODY MASS INDEX: 26.49 KG/M2 | WEIGHT: 159 LBS

## 2021-06-01 VITALS — HEIGHT: 65 IN | BODY MASS INDEX: 26.46 KG/M2

## 2021-06-01 VITALS — BODY MASS INDEX: 25.16 KG/M2 | WEIGHT: 151 LBS | HEIGHT: 65 IN

## 2021-06-01 VITALS — WEIGHT: 152 LBS | HEIGHT: 65 IN | BODY MASS INDEX: 25.33 KG/M2

## 2021-06-01 NOTE — TELEPHONE ENCOUNTER
Controlled Substance Refill Request  Medication Name:   Requested Prescriptions     Pending Prescriptions Disp Refills     ALPRAZolam (XANAX) 1 MG tablet 90 tablet 0     Sig: Take 1 tablet (1 mg total) by mouth 3 (three) times a day as needed for anxiety.     Date Last Fill: 9/11/2018  Pharmacy: [facility]      Submit electronically to pharmacy  Controlled Substance Agreement Date Scanned:   Encounter-Level CSA Scan Date:    There are no encounter-level csa scan date.       Last office visit with prescriber/PCP: 2/21/2019 Colette Anand MD OR same dept: 9/4/2019 Shana Shah MD OR same specialty: 9/4/2019 Shana Shah MD  Last physical: Visit date not found Last MTM visit: Visit date not found

## 2021-06-01 NOTE — TELEPHONE ENCOUNTER
RN cannot approve Refill Request    RN can NOT refill this medication med is not covered by policy/route to provider     . Last office visit: 2/21/2019 Colette Anand MD Last Physical: Visit date not found Last MTM visit: Visit date not found Last visit same specialty: 9/4/2019 Shana Shah MD.  Next visit within 3 mo: Visit date not found  Next physical within 3 mo: Visit date not found      Catherine Prater, [facility] Triage/Med Refill 9/16/2019    Requested Prescriptions   Pending Prescriptions Disp Refills     fluconazole (DIFLUCAN) 150 MG tablet [Pharmacy Med Name: FLUCONAZOLE 150MG TABLETS] 1 tablet 0     Sig: TAKE 1 TABLET(150 MG) BY MOUTH 1 TIME FOR 1 DOSE       There is no refill protocol information for this order

## 2021-06-01 NOTE — PROGRESS NOTES
ASSESSMENT/PLAN:    1. 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  For this reason, will start her on oral antibiotics.  I wrote her a work note excusing her from today and tomorrow.  If not significantly improving over the next few days, would have her see ophthalmology.  - cephalexin (KEFLEX) 500 MG capsule; Take 1 capsule (500 mg total) by mouth 4 (four) times a day for 10 days.  Dispense: 40 capsule; Refill: 0     Return in about 7 months (around 4/4/2020) for Wellness Visit/Healthcare Maintainance Visit, with your primary care doctor.     SUBJECTIVE:  Yossi Sky is a 39 y.o. female here for pain and swelling of the left upper eyelid.  x1 week  Got really big, then drained and got better, then worse again.  She has been applying frequent warm compresses and taking ibuprofen.  It is quite uncomfortable and it is hard for her to see due to obstruction of the swollen lid.  It seems like the initial bump of were started has moved to different spots.    Her only history of something similar was about 20 years ago she had some small spots that resolved spontaneously.      ROS:  Remainder review of systems is negative unless previously stated    PHQ-2 Total Score: 1 (4/16/2019  4:23 PM)  PHQ-9 Total Score: 7 (4/16/2019  4:23 PM)       The following portions of the patient's history were reviewed and updated as appropriate: allergies, current medications and problem list  Current Outpatient Medications on File Prior to Visit   Medication Sig     ALPRAZolam (XANAX) 1 MG tablet Take 1 tablet (1 mg total) by mouth 3 (three) times a day as needed for anxiety.     busPIRone (BUSPAR) 15 MG tablet Take 1 tablet (15 mg total) by mouth 2 (two) times a day.     cetirizine (ZYRTEC) 10 MG tablet Take 20 mg by mouth daily.     omeprazole (PRILOSEC) 20 MG capsule TAKE ONE CAPSULE BY  "MOUTH EVERY DAY     phentermine (ADIPEX-P) 37.5 mg tablet Take 1/2 to 1 tablet in the morning.     fluconazole (DIFLUCAN) 150 MG tablet TAKE 1 TABLET(150 MG) BY MOUTH 1 TIME FOR 1 DOSE     mupirocin (BACTROBAN) 2 % cream APPLY TOPICALLY TO THE AFFECTED AREA(S) THREE TIMES DAILY.     naltrexone (DEPADE) 50 mg tablet Take 0.5 tablets (25 mg total) by mouth 2 (two) times a day.     No current facility-administered medications on file prior to visit.         Social History     Tobacco Use   Smoking Status Current Every Day Smoker     Packs/day: 0.50     Start date: 3/18/2015   Smokeless Tobacco Never Used       OBJECTIVE:  :  /60   Pulse 97   Temp 99  F (37.2  C) (Oral)   Resp 20   Ht 5' 5\" (1.651 m)   Wt 158 lb 8 oz (71.9 kg)   LMP 08/31/2019 (Exact Date)   SpO2 97%   Breastfeeding? No   BMI 26.38 kg/m    Wt Readings from Last 3 Encounters:   09/04/19 158 lb 8 oz (71.9 kg)   04/16/19 154 lb (69.9 kg)   04/04/19 153 lb (69.4 kg)         Gen:  A&A, NAD  HEENT: Pupils equal round reactive to light, extraocular movements intact.  There is no pain with extraocular movement.  The left upper eyelid is significantly swollen diffusely.  There is a palpable nodule on the nasal side of the upper lid.  No pustular drainage at this time.  "

## 2021-06-02 ENCOUNTER — RECORDS - HEALTHEAST (OUTPATIENT)
Dept: ADMINISTRATIVE | Facility: CLINIC | Age: 41
End: 2021-06-02

## 2021-06-02 VITALS — BODY MASS INDEX: 25.49 KG/M2 | HEIGHT: 65 IN | WEIGHT: 153 LBS

## 2021-06-02 VITALS — BODY MASS INDEX: 22.99 KG/M2 | WEIGHT: 138 LBS | HEIGHT: 65 IN

## 2021-06-02 VITALS — WEIGHT: 154 LBS | BODY MASS INDEX: 25.66 KG/M2 | HEIGHT: 65 IN

## 2021-06-02 VITALS — BODY MASS INDEX: 25.33 KG/M2 | WEIGHT: 152 LBS | HEIGHT: 65 IN

## 2021-06-02 VITALS — BODY MASS INDEX: 25.33 KG/M2 | HEIGHT: 65 IN | WEIGHT: 152 LBS

## 2021-06-02 NOTE — TELEPHONE ENCOUNTER
Medication Question or Clarification  Who is calling: Pharmacy: [facility] - Fax received.   What medication are you calling about? (include dose and sig)    Disp Refills Start End    hydrOXYzine HCl (ATARAX) 25 MG tablet 30 tablet 3 9/5/2019     Sig - Route: Take 1 tablet (25 mg total) by mouth daily as needed for anxiety. - Oral    Sent to pharmacy as: hydrOXYzine HCl (ATARAX) 25 MG tablet    E-Prescribing Status: Receipt confirmed by pharmacy (9/5/2019  4:07 PM CDT)      Who prescribed the medication?: Colette Anand MD   What is your question/concern?: Pharmacy Comments:   Prescription days supply increase request.  We are asking permission to dispense a 3 month supply.  Please send a new prescription with an appropriate quantity to dispense a 3 month supply.   Pharmacy: [facility]    Okay to leave a detailed message?: No  Site CMT - Please call the pharmacy to obtain any additional needed information.

## 2021-06-02 NOTE — PROGRESS NOTES
Bariatric Follow Up Visit with a History of Previous Bariatric Surgery     Date of visit: 10/31/2019  Physician: Janelle Flores MD  Primary Care Provider:  Colette Anand MD Jamie L Thury   39 y.o.  female    Date of Surgery: 9/15/2014  Initial Weight: 243#  Initial BMI:   Today's Weight:   Wt Readings from Last 1 Encounters:   10/31/19 159 lb (72.1 kg)     Body mass index is 26.46 kg/m .      Assessment and Plan     Assessment: Yossi is a 39 y.o. year old female who is 5 yrs s/p  Frankie en Y Gastric Bypass with Dr. Sukhjinder Sky feels as if she has achieved the goals she hoped to accomplish through bariatric surgery and weight loss.    Encounter Diagnoses   Name Primary?     Hyperphagia      Abnormal craving      Postoperative malabsorption Yes     History of Frankie-en-Y gastric bypass          Current Outpatient Medications:      ALPRAZolam (XANAX) 1 MG tablet, Take 1 tablet (1 mg total) by mouth 3 (three) times a day as needed for anxiety., Disp: 90 tablet, Rfl: 0     busPIRone (BUSPAR) 15 MG tablet, Take 1 tablet (15 mg total) by mouth 2 (two) times a day., Disp: 180 tablet, Rfl: 3     cetirizine (ZYRTEC) 10 MG tablet, Take 20 mg by mouth daily., Disp: , Rfl:      fluconazole (DIFLUCAN) 150 MG tablet, TAKE 1 TABLET(150 MG) BY MOUTH 1 TIME FOR 1 DOSE, Disp: 1 tablet, Rfl: 0     hydrOXYzine HCl (ATARAX) 25 MG tablet, Take 1 tablet (25 mg total) by mouth daily as needed for anxiety., Disp: 90 tablet, Rfl: 3     mupirocin (BACTROBAN) 2 % cream, APPLY TOPICALLY TO THE AFFECTED AREA(S) THREE TIMES DAILY., Disp: 30 g, Rfl: 5     naltrexone (DEPADE) 50 mg tablet, Take 0.5 tablets (25 mg total) by mouth 2 (two) times a day., Disp: 90 tablet, Rfl: prn     omeprazole (PRILOSEC) 40 MG capsule, Take 1 capsule (40 mg total) by mouth daily., Disp: 90 capsule, Rfl: prn     phentermine (ADIPEX-P) 37.5 mg tablet, Take 1/2 to 1 tablet in the morning., Disp: 90 tablet, Rfl: 1     venlafaxine 150 mg TR24, Take 150 mg by  mouth daily., Disp: 90 each, Rfl: 0    Plan: refill phentermine, refill naltrexone. Increase PPI to 40mg. Get iron stores up to decrease cravings. Eat protein first.     No follow-ups on file.    Bariatric Surgery Review     Interim History/LifeChanges: Doing well. Feels epigastric discomfort despite 20mg omeprazole. Did well on 40mg in the past.  Maintaining a 85# weight loss. Taking a 5 hour energy drink daily.    Patient Concerns: epigastric discomfort  Appetite (1-10): non-stop cravings  GERD: yes    Medication changes: no    Vitamin Intake:   B-12   5 hour energy   MVI  occ   Vitamin D  occ   Calcium   no     Other  no              LABS: ordered    Nausea yes  Vomiting no  Constipation sometime  Diarrhea no  Rashes no  Hair Loss no  Calf tenderness no  Breathing difficulty no  Reactive Hypoglycemia yes  Light Headedness no   Moods increased venlafaxine    12 point ROS as above and otherwise negative      Habits:  Alcohol: zero  Tobacco: 1/2 ppd  Caffeine eod  NSAIDS no  Exercise Routine: on her feet at work.   3 meals/day yes  Protein first no  60 grams/day  Water Separate from meals yes  Calorie Containing Beverages Sprite occasionally  Restaurant eating/wk 0-2  Sleeping not well lately d/t sleeping on the couch  Stress low  CPAP: NA  Contraception: abstinence    Social History     Social History     Socioeconomic History     Marital status: Single     Spouse name: Not on file     Number of children: Not on file     Years of education: Not on file     Highest education level: Not on file   Occupational History     Not on file   Social Needs     Financial resource strain: Not on file     Food insecurity:     Worry: Not on file     Inability: Not on file     Transportation needs:     Medical: Not on file     Non-medical: Not on file   Tobacco Use     Smoking status: Current Every Day Smoker     Packs/day: 0.50     Start date: 3/18/2015     Smokeless tobacco: Never Used   Substance and Sexual Activity     Alcohol  use: Yes     Comment: once a month     Drug use: No     Sexual activity: Not Currently     Birth control/protection: None   Lifestyle     Physical activity:     Days per week: Not on file     Minutes per session: Not on file     Stress: Not on file   Relationships     Social connections:     Talks on phone: Not on file     Gets together: Not on file     Attends Sabianist service: Not on file     Active member of club or organization: Not on file     Attends meetings of clubs or organizations: Not on file     Relationship status: Not on file     Intimate partner violence:     Fear of current or ex partner: Not on file     Emotionally abused: Not on file     Physically abused: Not on file     Forced sexual activity: Not on file   Other Topics Concern     Not on file   Social History Narrative     Not on file       Past Medical History     Past Medical History:   Diagnosis Date     Anal fissure      Anemia      Anxiety      Depression      External hemorrhoids without mention of complication      Herpes simplex     genital, last outbreak around age 25     Migraine, unspecified, without mention of intractable migraine without mention of status migrainosus      Other and unspecified postsurgical nonabsorption 6/10/2015     Tobacco use 6/10/2015     Problem List     Patient Active Problem List   Diagnosis     Recurrent major depressive disorder, in partial remission (H)     Herpes Simplex Type II     Anxiety     Migraine Headache     Other and unspecified postsurgical nonabsorption     Tobacco use     Medications     Current Outpatient Medications   Medication Sig     ALPRAZolam (XANAX) 1 MG tablet Take 1 tablet (1 mg total) by mouth 3 (three) times a day as needed for anxiety.     busPIRone (BUSPAR) 15 MG tablet Take 1 tablet (15 mg total) by mouth 2 (two) times a day.     cetirizine (ZYRTEC) 10 MG tablet Take 20 mg by mouth daily.     fluconazole (DIFLUCAN) 150 MG tablet TAKE 1 TABLET(150 MG) BY MOUTH 1 TIME FOR 1 DOSE  "    hydrOXYzine HCl (ATARAX) 25 MG tablet Take 1 tablet (25 mg total) by mouth daily as needed for anxiety.     mupirocin (BACTROBAN) 2 % cream APPLY TOPICALLY TO THE AFFECTED AREA(S) THREE TIMES DAILY.     naltrexone (DEPADE) 50 mg tablet Take 0.5 tablets (25 mg total) by mouth 2 (two) times a day.     omeprazole (PRILOSEC) 40 MG capsule Take 1 capsule (40 mg total) by mouth daily.     phentermine (ADIPEX-P) 37.5 mg tablet Take 1/2 to 1 tablet in the morning.     venlafaxine 150 mg TR24 Take 150 mg by mouth daily.     Surgical History     Past Surgical History  She has a past surgical history that includes pr lap,cholecystectomy; [place] tooth extraction; and pr lap gastric bypass/marlena-en-y (N/A, 9/15/2014).    Objective-Exam     Constitutional:  /60   Resp 16   Ht 5' 5\" (1.651 m)   Wt 159 lb (72.1 kg)   SpO2 98%   BMI 26.46 kg/m    Height: 5' 5\" (1.651 m) (10/31/2019  1:30 PM)  Initial Weight: 243 lbs (10/31/2019  1:30 PM)  Weight: 159 lb (72.1 kg) (10/31/2019  1:30 PM)  Weight loss from initial: 84 (10/31/2019  1:30 PM)  % Weight loss: 34.57 % (10/31/2019  1:30 PM)  BMI (Calculated): 26.5 (10/31/2019  1:30 PM)  SpO2: 98 % (10/31/2019  1:30 PM)    General:  Pleasant and in no acute distress   Eyes:  EOMI  ENT:  Airway 1+  Moist mucous membranes  Neck:  Supple, No LAD, No thyromegaly, No carotid bruits appreciated  Respiratory: Normal respiratory effort, no cough, wheezes or crackles  CV:  Regular rate and Rhythm,nomurmurs, pulses  , no calf tenderness, no LE edema  Gastrointestinal: Abdomen NT/ND, BS+  Musculoskeletal: muscle mass WNL  Skin: color tan hair full, incisions nicely healed  Neurological: No tremor, normal gait  Psychiatric: alert and oriented X3, mood and affect normal    Counseling     We reviewed the important post op bariatric recommendations:  -eating 3 meals daily  -eating protein first, getting >60gm protein daily  -eating slowly, chewing food well  -avoiding/limiting calorie " containing beverages  -drinking water 15-30 minutes before or after meals  -choosing wheat, not white with breads, crackers, pastas, gonzalo, bagels, tortillas, rice  -limiting restaurant or cafeteria eating to twice a week or less    We discussed the importance of restorative sleep and stress management in maintaining a healthy weight.  We discussed the National Weight Control Registry healthy weight maintenance strategies and ways to optimize metabolism.  We discussed the importance of physical activity including cardiovascular and strength training in maintaining a healthier weight.    We discussed the importance of life-long vitamin supplementation and life-long  follow-up.    Yossi was reminded that, to avoid marginal ulcers she should avoid tobacco at all, alcohol in excess, caffeine in excess, and NSAIDS (unless indicated for cardioprotection or othewise and opposed by a PPI).    Janelle Flores MD, [facility].  10/31/2019  2:01 PM      No images are attached to the encounter.   30 minutes spent with patient. >50% in counseling and coordination of care.

## 2021-06-03 ENCOUNTER — RECORDS - HEALTHEAST (OUTPATIENT)
Dept: ADMINISTRATIVE | Facility: CLINIC | Age: 41
End: 2021-06-03

## 2021-06-03 VITALS
OXYGEN SATURATION: 98 % | RESPIRATION RATE: 16 BRPM | SYSTOLIC BLOOD PRESSURE: 100 MMHG | WEIGHT: 159 LBS | HEIGHT: 65 IN | BODY MASS INDEX: 26.49 KG/M2 | DIASTOLIC BLOOD PRESSURE: 60 MMHG

## 2021-06-03 VITALS
WEIGHT: 158.5 LBS | DIASTOLIC BLOOD PRESSURE: 60 MMHG | TEMPERATURE: 99 F | HEART RATE: 97 BPM | BODY MASS INDEX: 26.41 KG/M2 | SYSTOLIC BLOOD PRESSURE: 110 MMHG | RESPIRATION RATE: 20 BRPM | HEIGHT: 65 IN | OXYGEN SATURATION: 97 %

## 2021-06-03 NOTE — TELEPHONE ENCOUNTER
RN cannot approve Refill Request    RN can NOT refill this medication Protocol failed and NO refill given. Last office visit: 2/21/2019 Colette Anand MD Last Physical: Visit date not found Last MTM visit: Visit date not found Last visit same specialty: 9/4/2019 Shana Shah MD.  Next visit within 3 mo: Visit date not found  Next physical within 3 mo: Visit date not found      Irene Macario, [facility] Triage/Med Refill 11/8/2019    Requested Prescriptions   Pending Prescriptions Disp Refills     venlafaxine 150 mg TR24 [Pharmacy Med Name: VENLAFAXINE HCL ER 150MG TB24]  0     Sig: TAKE ONE TABLET BY MOUTH EVERY DAY       Venlafaxine/Desvenlafaxine Refill Protocol Failed - 11/8/2019  8:01 AM        Failed - Fasting lipid cascade in last year     Cholesterol   Date Value Ref Range Status   09/28/2018 193 <=199 mg/dL Final     Triglycerides   Date Value Ref Range Status   09/28/2018 64 <=149 mg/dL Final     HDL Cholesterol   Date Value Ref Range Status   09/28/2018 80 >=50 mg/dL Final     LDL Calculated   Date Value Ref Range Status   09/28/2018 100 <=129 mg/dL Final     Patient Fasting > 8hrs?   Date Value Ref Range Status   09/28/2018 No  Final             Passed - LFT or AST or ALT in last year     Albumin   Date Value Ref Range Status   10/31/2019 3.9 3.5 - 5.0 g/dL Final     Bilirubin, Total   Date Value Ref Range Status   10/31/2019 0.3 0.0 - 1.0 mg/dL Final     Bilirubin, Direct   Date Value Ref Range Status   11/15/2011 0.1 <0.6 mg/dL Final     Alkaline Phosphatase   Date Value Ref Range Status   10/31/2019 152 (H) 45 - 120 U/L Final     AST   Date Value Ref Range Status   10/31/2019 33 0 - 40 U/L Final     ALT   Date Value Ref Range Status   10/31/2019 15 0 - 45 U/L Final     Protein, Total   Date Value Ref Range Status   10/31/2019 7.0 6.0 - 8.0 g/dL Final                Passed - PCP or prescribing provider visit in last year     Last office visit with prescriber/PCP: 2/21/2019 Cheng  MD Colette OR same dept: 9/4/2019 Shana Shah MD OR same specialty: 9/4/2019 Shana Shah MD  Last physical: Visit date not found Last MTM visit: Visit date not found   Next visit within 3 mo: Visit date not found  Next physical within 3 mo: Visit date not found  Prescriber OR PCP: Colette Anand MD  Last diagnosis associated with med order: 1. Anxiety  - venlafaxine 150 mg TR24 [Pharmacy Med Name: VENLAFAXINE HCL ER 150MG TB24]; TAKE ONE TABLET BY MOUTH EVERY DAY; Refill: 0    If protocol passes may refill for 12 months if within 3 months of last provider visit (or a total of 15 months).             Passed - Blood Pressure in last year     BP Readings from Last 1 Encounters:   10/31/19 100/60

## 2021-06-03 NOTE — TELEPHONE ENCOUNTER
Pt called in with concerns regarding her recent lab results, especially her hgb and ferritin levels.  I let her know that I will let  know that she called but she will likely be reviewing her labs and sending her a message with her recommendations in the next few days.  Pt verbalized understanding.    Delia Little RN, CBN  [facility]  P [phone]  F [phone]

## 2021-06-04 VITALS
WEIGHT: 164 LBS | SYSTOLIC BLOOD PRESSURE: 110 MMHG | DIASTOLIC BLOOD PRESSURE: 70 MMHG | BODY MASS INDEX: 27.32 KG/M2 | HEIGHT: 65 IN

## 2021-06-04 VITALS
WEIGHT: 163.1 LBS | BODY MASS INDEX: 27.17 KG/M2 | RESPIRATION RATE: 17 BRPM | SYSTOLIC BLOOD PRESSURE: 110 MMHG | DIASTOLIC BLOOD PRESSURE: 66 MMHG | HEART RATE: 96 BPM | TEMPERATURE: 98.8 F | HEIGHT: 65 IN | OXYGEN SATURATION: 97 %

## 2021-06-04 VITALS — HEIGHT: 65 IN | BODY MASS INDEX: 27.32 KG/M2 | WEIGHT: 164 LBS

## 2021-06-05 ENCOUNTER — RECORDS - HEALTHEAST (OUTPATIENT)
Dept: SURGERY | Facility: CLINIC | Age: 41
End: 2021-06-05

## 2021-06-05 VITALS
SYSTOLIC BLOOD PRESSURE: 106 MMHG | OXYGEN SATURATION: 97 % | HEIGHT: 64 IN | BODY MASS INDEX: 26.98 KG/M2 | HEART RATE: 78 BPM | TEMPERATURE: 98 F | WEIGHT: 158 LBS | DIASTOLIC BLOOD PRESSURE: 64 MMHG | RESPIRATION RATE: 20 BRPM

## 2021-06-05 DIAGNOSIS — Z01.818 PRE-OPERATIVE EXAMINATION: ICD-10-CM

## 2021-06-06 NOTE — TELEPHONE ENCOUNTER
Patient Quality Outreach      Summary:    Patient is due/failing the following:   PHQ-9 Needed and Adult/Adolescent    Type of outreach:    Phone, left message for patient to call back.    Questions for provider review:    None                                                                                   **Start Working phrase here:**  Patient has the following on her problem list:     Asthma review      @FLOW([id number])@ and   Immunizations     Pneumococcal Vaccine(1 of 1 - PPSV23)                                                     Janet Desir      Chart routed to Not routed. Patient needs to schedule appt sometime in April.

## 2021-06-07 NOTE — TELEPHONE ENCOUNTER
----- Message from Maureen Carlos sent at 4/7/2020  2:54 PM CDT -----  Regarding: external lab request  Patient would like to get her HgB & Ferritin levels checked. Sense she is take supplements for it. Patient appointment is April 23, 2020 @ 8:00 am with Dr. Flores. Please call patient to let her know that the labs are in.

## 2021-06-07 NOTE — TELEPHONE ENCOUNTER
----- Message from Janelle Flores MD sent at 11/12/2019  8:30 AM CST -----  Regarding: f/u labs  PTH, D, CMP, CBC, ferritin in 6 mo.

## 2021-06-07 NOTE — TELEPHONE ENCOUNTER
Lab ordered by  and are due to be rechecked at the end of this month. Message sent to pt to let her know that she can have them drawn now if she's comfortable going into the lab or can wait if she is not comfortable.    Delia Little RN, CBN  [facility]  P [phone]  F [phone]

## 2021-06-07 NOTE — TELEPHONE ENCOUNTER
Does pt need to come in for lab ermias on 4/16/20 or can it be rescheduled until after May do to COVID-19?

## 2021-06-07 NOTE — PROGRESS NOTES
"Yossi Sky is a 40 y.o. female who is being evaluated via a billable telephone visit.      The patient has been notified of following:     \"This telephone visit will be conducted via a call between you and your physician/provider. We have found that certain health care needs can be provided without the need for a physical exam.  This service lets us provide the care you need with a short phone conversation.  If a prescription is necessary we can send it directly to your pharmacy.  If lab work is needed we can place an order for that and you can then stop by our lab to have the test done at a later time.    If during the course of the call the physician/provider feels a telephone visit is not appropriate, you will not be charged for this service.\"     Yossi Sky complains of  postoperative malabsorption.    I have reviewed and updated the patient's Past Medical History, Social History, Family History and Medication List.    ALLERGIES  Bentyl [dicyclomine]; Nsaids (non-steroidal anti-inflammatory drug); Sulfa (sulfonamide antibiotics); Adhesive; and Benadryl [diphenhydramine hcl]      Assessment/Plan:    1. Postoperative malabsorption  DXA Bone Density Scan   2. Tobacco use  DXA Bone Density Scan      Encouraged tobacco cessation, especially given COVID-19 being a respiratory virus    I have reviewed the note as documented above.  This accurately captures the substance of my conversation with the patient.    Phone call contact time 24 min    Call Started at: 8:00  Call Ended at: 8:24      Signature:  Janelle Flores MD, FAAFP      Bariatric Follow Up Visit with a History of Previous Bariatric Surgery     Date of visit: 4/23/2020  Physician: Janelle Flores MD  Primary Care Provider:  Colette Anand MD  Yossi Sky   40 y.o.  female    Date of Surgery: 9/14/2014  Initial Weight: 243#  Initial BMI:   Today's Weight:   Wt Readings from Last 1 Encounters:   04/23/20 164 lb (74.4 kg)     Body mass index is " 27.29 kg/m .      Assessment and Plan     Assessment: Yossi is a 40 y.o. year old female who is 5.5 yrs s/p  Frankie en Y Gastric Bypass with Dr. Sukhjinder Bartlettneva Sky feels as if she has achieved the goals she hoped to accomplish through bariatric surgery and weight loss.    Encounter Diagnoses   Name Primary?     Postoperative malabsorption Yes     Tobacco use          Current Outpatient Medications:      ALPRAZolam (XANAX) 1 MG tablet, Take 1 tablet (1 mg total) by mouth 3 (three) times a day as needed for anxiety., Disp: 90 tablet, Rfl: 0     busPIRone (BUSPAR) 15 MG tablet, Take 1 tablet (15 mg total) by mouth 2 (two) times a day., Disp: 180 tablet, Rfl: 3     cetirizine (ZYRTEC) 10 MG tablet, Take 20 mg by mouth daily., Disp: , Rfl:      cholecalciferol, vitamin D3, 5,000 unit Tab, Take 1 tablet by mouth daily., Disp: , Rfl:      cyanocobalamin, vitamin B-12, 1,000 mcg Subl, Place 1,000 mcg under the tongue daily., Disp: , Rfl:      ferrous sulfate 325 (65 FE) MG tablet, Take 1 tablet by mouth daily with breakfast., Disp: , Rfl:      hydrOXYzine HCl (ATARAX) 25 MG tablet, Take 1 tablet (25 mg total) by mouth daily as needed for anxiety., Disp: 90 tablet, Rfl: 3     Lactobac no.41/Bifidobact no.7 (PROBIOTIC-10 ORAL), Take by mouth., Disp: , Rfl:      multivit with iron,minerals (MULTIVITAMIN WITH IRON-MINERAL ORAL), Take 2 tablets by mouth daily., Disp: , Rfl:      naltrexone (DEPADE) 50 mg tablet, Take 0.5 tablets (25 mg total) by mouth 2 (two) times a day., Disp: 90 tablet, Rfl: prn     omeprazole (PRILOSEC) 40 MG capsule, Take 1 capsule (40 mg total) by mouth daily., Disp: 90 capsule, Rfl: prn     phentermine (ADIPEX-P) 37.5 mg tablet, Take 1/2 to 1 tablet in the morning., Disp: 90 tablet, Rfl: 1     venlafaxine 150 mg TR24, TAKE ONE TABLET BY MOUTH EVERY DAY, Disp: 90 each, Rfl: 3     mupirocin (BACTROBAN) 2 % cream, APPLY TOPICALLY TO THE AFFECTED AREA(S) THREE TIMES DAILY., Disp: 30 g, Rfl: 5    Plan:   Count steps. Encoauraged tobacco cessation consider COVID-19 being a respiratory virus a motivator    Return in about 6 months (around 10/23/2020).    Bariatric Surgery Review     Interim History/LifeChanges: 5# up. Not moving as much. Snacking more.     Patient Concerns: weight is up  Appetite (1-10): stable-just bordom eating  GERD: PPI    Medication changes: no    Vitamin Intake:   B-12   SL   MVI  2/d   Vitamin D  5,000U   Calcium   1/d     Other  probiotic and extra iron              LABS: need to be ordered  Iron and D have improved  Nausea no  Vomiting no  Constipation no  Diarrhea no  Rashes no  Hair Loss no  Calf tenderness no  Breathing difficulty no  Reactive Hypoglycemia yes  Light Headedness yes   Moods lower than normal    12 point ROS as above and otherwise negative      Habits:  Alcohol: more than before 1/d  Tobacco: 1/2 ppd  Caffeine 1c/d  NSAIDS no  Exercise Routine: walks, yardwork, shoveling rocks  3 meals/day yes  Protein first yes  60 grams/day  Water Separate from meals yes  Calorie Containing Beverages Sprite with cocktails  Restaurant eating/wk 0-1  Sleeping 7 hours  Stress high  CPAP: NA  Contraception: abstinence  DEXA:ordered    Social History     Social History     Socioeconomic History     Marital status: Single     Spouse name: Not on file     Number of children: Not on file     Years of education: Not on file     Highest education level: Not on file   Occupational History     Not on file   Social Needs     Financial resource strain: Not on file     Food insecurity     Worry: Not on file     Inability: Not on file     Transportation needs     Medical: Not on file     Non-medical: Not on file   Tobacco Use     Smoking status: Current Every Day Smoker     Packs/day: 0.50     Start date: 3/18/2015     Smokeless tobacco: Never Used   Substance and Sexual Activity     Alcohol use: Yes     Comment: once a month     Drug use: No     Sexual activity: Not Currently     Birth  control/protection: None   Lifestyle     Physical activity     Days per week: Not on file     Minutes per session: Not on file     Stress: Not on file   Relationships     Social connections     Talks on phone: Not on file     Gets together: Not on file     Attends [employer] service: Not on file     Active member of club or organization: Not on file     Attends meetings of clubs or organizations: Not on file     Relationship status: Not on file     Intimate partner violence     Fear of current or ex partner: Not on file     Emotionally abused: Not on file     Physically abused: Not on file     Forced sexual activity: Not on file   Other Topics Concern     Not on file   Social History Narrative     Not on file       Past Medical History     Past Medical History:   Diagnosis Date     Anal fissure      Anemia      Anxiety      Depression      External hemorrhoids without mention of complication      Herpes simplex     genital, last outbreak around age 25     Migraine, unspecified, without mention of intractable migraine without mention of status migrainosus      Other and unspecified postsurgical nonabsorption 6/10/2015     Tobacco use 6/10/2015     Problem List     Patient Active Problem List   Diagnosis     Recurrent major depressive disorder, in partial remission (H)     Herpes Simplex Type II     Anxiety     Migraine Headache     Other and unspecified postsurgical nonabsorption     Tobacco use     Medications     Current Outpatient Medications   Medication Sig     ALPRAZolam (XANAX) 1 MG tablet Take 1 tablet (1 mg total) by mouth 3 (three) times a day as needed for anxiety.     busPIRone (BUSPAR) 15 MG tablet Take 1 tablet (15 mg total) by mouth 2 (two) times a day.     cetirizine (ZYRTEC) 10 MG tablet Take 20 mg by mouth daily.     cholecalciferol, vitamin D3, 5,000 unit Tab Take 1 tablet by mouth daily.     cyanocobalamin, vitamin B-12, 1,000 mcg Subl Place 1,000 mcg under the tongue daily.     ferrous sulfate 325  "(65 FE) MG tablet Take 1 tablet by mouth daily with breakfast.     hydrOXYzine HCl (ATARAX) 25 MG tablet Take 1 tablet (25 mg total) by mouth daily as needed for anxiety.     Lactobac no.41/Bifidobact no.7 (PROBIOTIC-10 ORAL) Take by mouth.     multivit with iron,minerals (MULTIVITAMIN WITH IRON-MINERAL ORAL) Take 2 tablets by mouth daily.     naltrexone (DEPADE) 50 mg tablet Take 0.5 tablets (25 mg total) by mouth 2 (two) times a day.     omeprazole (PRILOSEC) 40 MG capsule Take 1 capsule (40 mg total) by mouth daily.     phentermine (ADIPEX-P) 37.5 mg tablet Take 1/2 to 1 tablet in the morning.     venlafaxine 150 mg TR24 TAKE ONE TABLET BY MOUTH EVERY DAY     mupirocin (BACTROBAN) 2 % cream APPLY TOPICALLY TO THE AFFECTED AREA(S) THREE TIMES DAILY.     Surgical History     Past Surgical History  She has a past surgical history that includes pr lap,cholecystectomy; [place] tooth extraction; and pr lap gastric bypass/marlena-en-y (N/A, 9/15/2014).    Objective-Exam     Constitutional:  Ht 5' 5\" (1.651 m)   Wt 164 lb (74.4 kg)   BMI 27.29 kg/m    Height: 5' 5\" (1.651 m) (4/23/2020  8:06 AM)  Initial Weight: 243 lbs (10/31/2019  1:30 PM)  Weight: 164 lb (74.4 kg) (4/23/2020  8:06 AM)  Weight loss from initial: 84 (10/31/2019  1:30 PM)  % Weight loss: 34.57 % (10/31/2019  1:30 PM)  BMI (Calculated): 27.3 (4/23/2020  8:06 AM)  SpO2: 98 % (10/31/2019  1:30 PM)    General:  Pleasant and in no acute distress     Psychiatric: alert and oriented X3, mood and affect normal    Counseling     We reviewed the important post op bariatric recommendations:  -eating 3 meals daily  -eating protein first, getting >60gm protein daily  -eating slowly, chewing food well  -avoiding/limiting calorie containing beverages  -drinking water 15-30 minutes before or after meals  -choosing wheat, not white with breads, crackers, pastas, gonzalo, bagels, tortillas, rice  -limiting restaurant or cafeteria eating to twice a week or less    We discussed " the importance of restorative sleep and stress management in maintaining a healthy weight.  We discussed the National Weight Control Registry healthy weight maintenance strategies and ways to optimize metabolism.  We discussed the importance of physical activity including cardiovascular and strength training in maintaining a healthier weight.    We discussed the importance of life-long vitamin supplementation and life-long  follow-up.    Yossi was reminded that, to avoid marginal ulcers she should avoid tobacco at all, alcohol in excess, caffeine in excess, and NSAIDS (unless indicated for cardioprotection or othewise and opposed by a PPI).    Janelle Flores MD, [facility].  4/23/2020  8:08 AM

## 2021-06-09 NOTE — TELEPHONE ENCOUNTER
FYI - Status Update  Who is Calling: Patient  Update: Patient stated she would like a 1-week supply of the buspirone sent to [facility] because her mail order Rx will not get to her in time.  Okay to leave a detailed message?:  No

## 2021-06-09 NOTE — TELEPHONE ENCOUNTER
Calling to verify that patient did receive Buspirone HCL.  Per chart was ordered 6/17/20.  Discarding 6/19/20 refill request from pharmacy.    Left voice message requesting call back to [facility] clinic at [phone] if refill not received.

## 2021-06-09 NOTE — PROGRESS NOTES
Chief Complaint   Patient presents with     Vaginal Discharge     more than normal         HPI:   Yossi Sky is a 40 y.o. female c/o increased vaginal discharge over the last week.  No itching.  Hasn't noticed any odor.  No pain.  No dysuria.  Patient's last menstrual period was 07/13/2020 (exact date).   New partner      ROS:  A 10 point comprehensive review of systems was negative except as noted.     Medications:  Current Outpatient Medications on File Prior to Visit   Medication Sig Dispense Refill     ALPRAZolam (XANAX) 1 MG tablet Take 1 tablet (1 mg total) by mouth 3 (three) times a day as needed for anxiety. 90 tablet 0     busPIRone (BUSPAR) 15 MG tablet Take 1 tablet (15 mg total) by mouth 2 (two) times a day. 15 tablet 0     cetirizine (ZYRTEC) 10 MG tablet Take 20 mg by mouth daily.       cholecalciferol, vitamin D3, 5,000 unit Tab Take 1 tablet by mouth daily.       cyanocobalamin, vitamin B-12, 1,000 mcg Subl Place 1,000 mcg under the tongue daily.       ferrous sulfate 325 (65 FE) MG tablet Take 1 tablet by mouth daily with breakfast.       hydrOXYzine HCl (ATARAX) 25 MG tablet Take 1 tablet (25 mg total) by mouth daily as needed for anxiety. 90 tablet 3     Lactobac no.41/Bifidobact no.7 (PROBIOTIC-10 ORAL) Take by mouth.       multivit with iron,minerals (MULTIVITAMIN WITH IRON-MINERAL ORAL) Take 2 tablets by mouth daily.       naltrexone (DEPADE) 50 mg tablet Take 0.5 tablets (25 mg total) by mouth 2 (two) times a day. 90 tablet prn     omeprazole (PRILOSEC) 40 MG capsule Take 1 capsule (40 mg total) by mouth daily. 90 capsule prn     phentermine (ADIPEX-P) 37.5 mg tablet Take 1/2 to 1 tablet in the morning. 90 tablet 1     venlafaxine 150 mg TR24 Take 1 tablet by mouth daily. 90 each 3     mupirocin (BACTROBAN) 2 % cream APPLY TOPICALLY TO THE AFFECTED AREA(S) THREE TIMES DAILY. 30 g 5     No current facility-administered medications on file prior to visit.          Social History:  Social  "History     Tobacco Use     Smoking status: Current Every Day Smoker     Packs/day: 0.50     Start date: 3/18/2015     Smokeless tobacco: Never Used   Substance Use Topics     Alcohol use: Yes     Comment: once a month         Physical Exam:   Vitals:    07/24/20 1652   BP: 110/66   Pulse: 96   Resp: 17   Temp: 98.8  F (37.1  C)   TempSrc: Oral   SpO2: 97%   Weight: 163 lb 1.6 oz (74 kg)   Height: 5' 5\" (1.651 m)       GEN:  NAD  LUNGS:  Clear to auscultation without wheezing.  Normal effort.  HEART:  RRR without murmur, rub or gallop   ABDOMEN: Normal BS,  Soft and nontender,  No masses.  No CVA tenderness.  PELVIC:    External genitalia: Normal  Vagina: Small amount white discharge.  Cervix: No lesions. No cervical motion tenderness  Uterus:  Small, nontender.  Adnexae:  Without masses or tenderness.     LABS:  Results for orders placed or performed in visit on 07/24/20   Wet Prep, Vaginal    Specimen: Genital   Result Value Ref Range    Yeast Result No yeast seen No yeast seen    Trichomonas No Trichomonas seen No Trichomonas seen    Clue Cells, Wet Prep Clue cells seen (!) No Clue cells seen        Assessment/Plan:    1. Bacterial vaginosis  Chlamydia trachomatis & Neisseria gonorrhoeae, Amplified Detection    Wet Prep, Vaginal    metroNIDAZOLE (FLAGYL) 500 MG tablet   2. Screen for STD (sexually transmitted disease)  Syphilis Screen, [place]    HIV Antigen/Antibody Screening [place]      Bacterial vaginosis-treat with metronidazole.    Other testing as ordered.    Recheck if worsening or not improving.,          Sherlyn Christina MD      7/24/2020    The following portions of the patient's history were reviewed and updated as appropriate: allergies, current medications, past family history, past medical history, past social history, past surgical history and problem list.      "

## 2021-06-11 NOTE — PATIENT INSTRUCTIONS - HE
[facility] Bariatric Care  Nutritional Guidelines  Gastric Bypass 18 Months Post Op and Beyond    General Guidelines and Helpful Hints:    Eat 3 meals per day + protein supplement(s). No snacks between meals.  o Do not skip meals.  This can cause overeating at the next meal and will prevent adequate protein and nutritional intake.    Aim for 60-80 grams of protein per day.  o Always eat your protein first. This assists with optimal nutrition and helps you stay full longer.  o Depending on your portion size, you may need to drink approved protein supplement between meals to achieve protein goals. Follow recommendations of your Dietitian.     Eat your protein first, and then follow with fiber.   o It is not necessary to count your fiber, but 15-20 grams per day is recommended.    o Add fiber by including fruits, vegetables, whole grains, and beans.     Portions should remain about 1 cup per meal. Use measuring cups to be accurate.    Continue to use saucer/salad plates, infant/toddler silverware to keep portion sizes small and take small bites.    Eat S-L-O-W-L-Y to make each meal last 20-30 minutes. Always stop eating when satisfied.    Continue to use caution with foods containing skins, peels or membranes. Chew well!    Aim for 64 oz. of calorie-free fluids daily.  o Continue to avoid caffeine and carbonation. If you choose to drink alcohol, do so in moderation.   o Remember to avoid drinking during meals, 15-30 minutes before and 30 minutes after.    Exercise is peters for continued weight loss and weight maintenance. Aim for 30-60 minutes of physical activity most days of the week. Include cardiovascular and strength training.    If having trouble tolerating meat, try using a crock-pot, tinfoil tent, steamer or other moist cooking method to create tender meats. Add broth or low-fat gravy to help meat stay moist.     Avoid high sugar and high fat foods to prevent dumping syndrome.  o Check nutrition labels for less  than 10 grams of sugar and less than 10 grams of fat per serving.    Continue Taking Vitamins/Minerals:  o 2587-1140 mcg of Sublingual B-12 daily  o 1 Multivitamin with Iron twice daily (chewable or swallow tabs)  o 500-600 mg Calcium Citrate twice daily (chewable or swallow tabs)  o 5000 IU Vitamin D3 daily    Sample Grocery List    Protein:    Fat free Greek or light yogurt (less than 10 grams sugar)    Fat free or low-fat cottage cheese    String cheese or reduced fat cheese slices    Tuna, salmon, crab, egg, or chicken salad made with light or fat free mayonnaise    Egg or Egg Substitute    Lean/extra lean turkey, beef, bison, venison (ground, sirloin, round, flank)    Pork loin or tenderloin (grilled, baked, broiled)    Fish such as salmon, tuna, trout, tilapia, etc. (grilled, baked, broiled)    Tender cuts of lean (skinless) turkey or chicken    Lean deli meats: turkey, lean ham, chicken, lean roast beef    Beans such as kidney, garbanzo, black, phipps, or low-fat/fat free refried beans    Peanut butter (natural preferred). Limit to 1 Tbsp. per day.    Low-fat meatloaf (made with lean ground beef or turkey)    Sloppy Joes made with low-sugar ketchup and lean ground beef or turkey    Soy or vegetable protein (i.e. vegan crumbles, soy/veggie burger, tofu)    Hummus    Vegetables:    Fresh: cooked or raw (as tolerated)    Frozen vegetables    Canned vegetables (low sodium or no salt added, rinse before cooking/eating)    (Ok to have skins/peels/membranes/seeds - just chew well)    Fruits:    Fresh fruit    Frozen fruit (no sugar added)    Canned fruit (packed in its own juice, NOT syrup)    (Ok to have skins/peels/membranes/seeds - just chew well)    Starch:    Unsweetened whole-grain hot cereal (or high fiber cold cereal, dry)    Toasted whole wheat bread or [place] Thins    Whole grain crackers    Baked /boiled/mashed potato/sweet potato    Cooked whole grain pasta, brown rice, or other cooked whole  grains    Starchy vegetables: corn, peas, winter squash    Protein Supplement:     Ready to drink protein shake with:  o 15-30 grams protein per serving  o Less than 10 grams total carbohydrate per serving     Protein powder mixed with:  o  Skim or 1% milk  o Low fat or fat free Lactaid milk, plain or no sugar added soymilk  o Water     Fats: (use in moderation)    1 teaspoon of soft tub margarine    1 teaspoon olive oil, canola oil, or peanut oil    1 tablespoon of low-fat neal or salad dressing     Sample Menu for 18+ months after Gastric Bypass    You do NOT need to eat/drink the full portion sizes listed below  Always stop when you are satisfied    Breakfast   cup 1% cottage cheese     cup mixed berries   Lunch 2 oz lean roast beef on   [place] Thin with 1 tsp. light neal    small tomato, chopped, mixed with 1 tsp. light vinaigrette dressing   Supplement Approved protein supplement (if needed between meals)   Dinner 2 oz grilled salmon    cup salad greens with 1 tsp. light salad dressing and 1 tsp. ground flax seed    cup quinoa or brown rice     Breakfast   cup egg substitute with   cup sautéed chopped vegetables  2 light [place] Krisp crackers   Lunch Tuna Melt:   cup tuna mixed with 1 tsp. light neal over   [place] Thin. Top with 2-3 slices cucumber and 1 oz slice of low fat cheese   Supplement 1 cup skim milk (if needed between meals)   Dinner 3 oz  grilled, broiled, or baked seasoned skinless chicken breast    cup asparagus     Breakfast   cup plain oatmeal made with skim or 1% milk with 1 Tbsp. flavored/unflavored protein powder added  1 mozzarella string cheese   Lunch 2 oz deli turkey breast  1/3 cup salad with 1 tsp. light salad dressing, 1/8 of a whole avocado and 1 Tbsp. sunflower seeds   Dinner 3 oz. pork loin made in a crock pot, seasoned with a spice rub    cup cooked carrots   Supplement Approved protein supplement (if needed between meals)     Breakfast 1 cup breakfast casserole made with egg  substitute, turkey sausage,  and steamed, chopped bell peppers   Supplement  1 cup light Greek yogurt (if needed between meals)   Lunch 2 oz. teriyaki turkey    cup mashed sweet potato with 1-2 spritzes of spray butter (like [place])    cup fresh pineapple   Dinner 3 oz low fat meatloaf    cup roasted garlic zucchini     Breakfast   cup leftover breakfast casserole    cup no sugar added applesauce with 1 Tbsp. unflavored protein powder and a sprinkle of cinnamon    Lunch 3 oz shrimp with 1-2 Tbsp. low-sugar cocktail sauce for dipping    c. whole wheat pasta drizzled with   tsp. olive oil   Supplement 1 cup skim/1% milk with scoop of protein powder (if needed between meals)   Dinner Grilled, seasoned kebob with 2 oz lean beef and   cup vegetables     Breakfast Breakfast pizza:   [place] Thin spread with 1 Tbsp. low sugar spaghetti sauce,   cup shredded low fat cheese, melted and 1 slice of [place] alvarado     cup fresh fruit mixed with chopped almonds   Lunch   cup black bean soup  4-5 whole grain crackers   Dinner 3 oz  tilapia with lemon pepper seasoning    cup stewed tomatoes   Supplement 1 string cheese (if needed between meals)     Breakfast 2 hard boiled eggs (discard 1 egg yolk)    whole wheat English Muffin with 1 tsp. low sugar jelly   Lunch   cup leftover black bean soup topped with 1-2 Tbsp. low fat cheese  2-3 light [place] Krisp crackers   Supplement Approved protein supplement (if needed between meals)   Dinner 3 oz sirloin steak    cup steamed broccoli     OK to increase your Phentermine to 1 tab in the am or 1/2 tab two times a day.  OK to take your naltrexone any time of the day

## 2021-06-11 NOTE — PROGRESS NOTES
Bariatric Follow Up Visit with a History of Previous Bariatric Surgery     Date of visit: 9/17/2020  Physician: Janelle Flores MD  Primary Care Provider:  Colette Anand MD Jamie L Thury   40 y.o.  female    Date of Surgery: 9/17/2014  Initial Weight: 243#  Initial BMI: 243#  Today's Weight:   Wt Readings from Last 1 Encounters:   09/17/20 164 lb (74.4 kg)         Assessment and Plan     Assessment: Yossi is a 40 y.o. year old female who is 6 yrs s/p  Frankie en Y Gastric Bypass with Dr. Sukhjinder Sky feels as if she has achieved the goals she hoped to accomplish through bariatric surgery and weight loss.    Encounter Diagnosis   Name Primary?     Postoperative malabsorption Yes         Current Outpatient Medications:      ALPRAZolam (XANAX) 1 MG tablet, Take 1 tablet (1 mg total) by mouth 3 (three) times a day as needed for anxiety., Disp: 90 tablet, Rfl: 0     busPIRone (BUSPAR) 15 MG tablet, Take 1 tablet (15 mg total) by mouth 2 (two) times a day., Disp: 15 tablet, Rfl: 0     cetirizine (ZYRTEC) 10 MG tablet, Take 20 mg by mouth daily., Disp: , Rfl:      cholecalciferol, vitamin D3, 5,000 unit Tab, Take 1 tablet by mouth daily., Disp: , Rfl:      cyanocobalamin, vitamin B-12, 1,000 mcg Subl, Place 1,000 mcg under the tongue daily., Disp: , Rfl:      ferrous sulfate 325 (65 FE) MG tablet, Take 1 tablet by mouth daily with breakfast., Disp: , Rfl:      hydrOXYzine HCl (ATARAX) 25 MG tablet, Take 1 tablet (25 mg total) by mouth daily as needed for anxiety., Disp: 90 tablet, Rfl: 3     Lactobac no.41/Bifidobact no.7 (PROBIOTIC-10 ORAL), Take by mouth., Disp: , Rfl:      multivit with iron,minerals (MULTIVITAMIN WITH IRON-MINERAL ORAL), Take 2 tablets by mouth daily., Disp: , Rfl:      naltrexone (DEPADE) 50 mg tablet, Take 0.5 tablets (25 mg total) by mouth 2 (two) times a day., Disp: 90 tablet, Rfl: prn     omeprazole (PRILOSEC) 40 MG capsule, Take 1 capsule (40 mg total) by mouth daily., Disp: 90  "capsule, Rfl: prn     phentermine (ADIPEX-P) 37.5 mg tablet, Take 1/2 to 1 tablet in the morning., Disp: 90 tablet, Rfl: 1     venlafaxine 150 mg TR24, Take 1 tablet by mouth daily., Disp: 90 each, Rfl: 3    Plan:OK to take phentermine 1/2 in am and 1/2 at noon. Naltrexone and phentermine can be taken at the same time. Annual labs ordered.   Maintain structured meals, exercise and sleep as well as social interaction.    Return for Next scheduled follow up.    Bariatric Surgery Review     Interim History/LifeChanges: 5# up from last yr. Vitamins with consistency.     Patient Concerns: weight gain  Appetite (1-10):   GERD: no    Medication changes: none    Vitamin Intake:   B-12   SL   MVI  2/d   Vitamin D  5,000 thinks   Calcium   dietary     Other                LABS: \"Reviewed  From April and last October  Nausea yes  Vomiting no  Constipation no  Diarrhea no  Rashes no  Hair Loss no  Calf tenderness no  Breathing difficulty no  Reactive Hypoglycemia yes  Light Headedness no   Moods high stress    12 point ROS as above and otherwise negative      Habits:  Alcohol: 0-4  Tobacco: 5/d  Caffeine on and off  NSAIDS no  Exercise Routine: walking 30 minutes day  3 meals/day yes  Protein first yes  60 grams/day  Water Separate from meals yes  Calorie Containing Beverages no  Restaurant eating/wk 1/wk  Sleeping 5-6 hours  Stress high5  CPAP: NA  Contraception: abstinence  DEXA:ordered previously    Social History     Social History     Socioeconomic History     Marital status: Single     Spouse name: Not on file     Number of children: Not on file     Years of education: Not on file     Highest education level: Not on file   Occupational History     Not on file   Social Needs     Financial resource strain: Not on file     Food insecurity     Worry: Not on file     Inability: Not on file     Transportation needs     Medical: Not on file     Non-medical: Not on file   Tobacco Use     Smoking status: Current Every Day Smoker "     Packs/day: 0.50     Start date: 3/18/2015     Smokeless tobacco: Never Used   Substance and Sexual Activity     Alcohol use: Yes     Comment: once a month     Drug use: No     Sexual activity: Not Currently     Birth control/protection: None   Lifestyle     Physical activity     Days per week: Not on file     Minutes per session: Not on file     Stress: Not on file   Relationships     Social connections     Talks on phone: Not on file     Gets together: Not on file     Attends Caodaism service: Not on file     Active member of club or organization: Not on file     Attends meetings of clubs or organizations: Not on file     Relationship status: Not on file     Intimate partner violence     Fear of current or ex partner: Not on file     Emotionally abused: Not on file     Physically abused: Not on file     Forced sexual activity: Not on file   Other Topics Concern     Not on file   Social History Narrative     Not on file       Past Medical History     Past Medical History:   Diagnosis Date     Anal fissure      Anemia      Anxiety      Depression      External hemorrhoids without mention of complication      Herpes simplex     genital, last outbreak around age 25     Migraine, unspecified, without mention of intractable migraine without mention of status migrainosus      Other and unspecified postsurgical nonabsorption 6/10/2015     Tobacco use 6/10/2015     Problem List     Patient Active Problem List   Diagnosis     Recurrent major depressive disorder, in partial remission (H)     Herpes Simplex Type II     Anxiety     Migraine Headache     Other and unspecified postsurgical nonabsorption     Tobacco use     Medications     Current Outpatient Medications   Medication Sig     ALPRAZolam (XANAX) 1 MG tablet Take 1 tablet (1 mg total) by mouth 3 (three) times a day as needed for anxiety.     busPIRone (BUSPAR) 15 MG tablet Take 1 tablet (15 mg total) by mouth 2 (two) times a day.     cetirizine (ZYRTEC) 10 MG  "tablet Take 20 mg by mouth daily.     cholecalciferol, vitamin D3, 5,000 unit Tab Take 1 tablet by mouth daily.     cyanocobalamin, vitamin B-12, 1,000 mcg Subl Place 1,000 mcg under the tongue daily.     ferrous sulfate 325 (65 FE) MG tablet Take 1 tablet by mouth daily with breakfast.     hydrOXYzine HCl (ATARAX) 25 MG tablet Take 1 tablet (25 mg total) by mouth daily as needed for anxiety.     Lactobac no.41/Bifidobact no.7 (PROBIOTIC-10 ORAL) Take by mouth.     multivit with iron,minerals (MULTIVITAMIN WITH IRON-MINERAL ORAL) Take 2 tablets by mouth daily.     naltrexone (DEPADE) 50 mg tablet Take 0.5 tablets (25 mg total) by mouth 2 (two) times a day.     omeprazole (PRILOSEC) 40 MG capsule Take 1 capsule (40 mg total) by mouth daily.     phentermine (ADIPEX-P) 37.5 mg tablet Take 1/2 to 1 tablet in the morning.     venlafaxine 150 mg TR24 Take 1 tablet by mouth daily.     Surgical History     Past Surgical History  She has a past surgical history that includes pr lap,cholecystectomy; [place] tooth extraction; and pr lap gastric bypass/marlena-en-y (N/A, 9/15/2014).    Objective-Exam     Constitutional:  /70 (Patient Site: Right Arm, Patient Position: Sitting, Cuff Size: Adult Regular)   Ht 5' 5\" (1.651 m)   Wt 164 lb (74.4 kg)   Breastfeeding No   BMI 27.29 kg/m    Height: 5' 5\" (1.651 m) (9/17/2020  2:41 PM)  Initial Weight: 243 lbs (10/31/2019  1:30 PM)  Weight: 164 lb (74.4 kg) (9/17/2020  2:41 PM)  Weight loss from initial: 84 (10/31/2019  1:30 PM)  % Weight loss: 34.57 % (10/31/2019  1:30 PM)  BMI (Calculated): 27.3 (9/17/2020  2:41 PM)  SpO2: 97 % (7/24/2020  4:52 PM)    General:  Pleasant and in no acute distress   Eyes:  EOMI  ENT:  Airway 2+  Moist mucous membranes  Neck:  Supple, No LAD, No thyromegaly, No carotid bruits appreciated  Respiratory: Normal respiratory effort, no cough, wheezes or crackles  CV:  Regular rate and Rhythm,no murmurs, pulses 2+, no calf tenderness, no LE " edema  Gastrointestinal: Abdomen NT/ND, BS+  Musculoskeletal: muscle mass WNL  Skin: color tan hair full, incisions nicely healed  Neurological: No tremor, normal gait  Psychiatric: alert and oriented X3, mood and affect normal    Counseling     We reviewed the important post op bariatric recommendations:  -eating 3 meals daily  -eating protein first, getting >60gm protein daily  -eating slowly, chewing food well  -avoiding/limiting calorie containing beverages  -drinking water 15-30 minutes before or after meals  -choosing wheat, not white with breads, crackers, pastas, gonzalo, bagels, tortillas, rice  -limiting restaurant or cafeteria eating to twice a week or less    We discussed the importance of restorative sleep and stress management in maintaining a healthy weight.  We discussed the National Weight Control Registry healthy weight maintenance strategies and ways to optimize metabolism.  We discussed the importance of physical activity including cardiovascular and strength training in maintaining a healthier weight.    We discussed the importance of life-long vitamin supplementation and life-long  follow-up.    Yossi was reminded that, to avoid marginal ulcers she should avoid tobacco at all, alcohol in excess, caffeine in excess, and NSAIDS (unless indicated for cardioprotection or othewise and opposed by a PPI).    Janelle Flores MD, FAAFP  [facility].  9/17/2020  2:58 PM      No images are attached to the encounter.   30 minutes spent with patient. >50% in counseling and coordination of care.

## 2021-06-12 NOTE — PROGRESS NOTES
Assessment: /    Plan:    1. Pelvic inflammatory disease  cefTRIAXone (ROCEPHIN) injection 250 mg    azithromycin (ZITHROMAX) 500 MG tablet       Gonorrhea and Chlamydia testing supplies are on back order, therefore testing is not done today, because it will not change treatment.  Both of these organisms would be covered by the 2 antibiotics.  Normal urogenital vandana could also be the cause, and would be covered by the 2 antibiotics.    No evidence of intra-abdominal process requiring surgery.    Recheck if any problem.    This was a 40 minute visit with >50% of the time spent in face-to-face counseling and coordination of care regarding: Pelvic inflammatory disease.      Subjective:    HPI:  Yossi Sky is a 40-year-old female presenting with lower abdominal pain.  This began 3 days ago.  It is located in the left lower quadrant, and she also notes pelvic pain.  Pain is a little bit better today than it was yesterday.  She had a urinary tract infection 2 weeks ago which was treated with antibiotics, and then needed fluconazole to treat a yeast infection.  She does not have vaginal discharge.  She has 1 sexual partner.    Past surgical history: Gastric bypass.      Review of Systems: No fever, nausea, diarrhea.      Current Outpatient Medications   Medication Sig Dispense Refill     ALPRAZolam (XANAX) 1 MG tablet Take 1 tablet (1 mg total) by mouth 3 (three) times a day as needed for anxiety. 90 tablet 0     busPIRone (BUSPAR) 15 MG tablet Take 1 tablet (15 mg total) by mouth 2 (two) times a day. 15 tablet 0     cetirizine (ZYRTEC) 10 MG tablet Take 20 mg by mouth daily.       cholecalciferol, vitamin D3, 5,000 unit Tab Take 1 tablet by mouth daily.       cyanocobalamin, vitamin B-12, 1,000 mcg Subl Place 1,000 mcg under the tongue daily.       ferrous sulfate 325 (65 FE) MG tablet Take 1 tablet by mouth daily with breakfast.       hydrOXYzine HCl (ATARAX) 25 MG tablet Take 1 tablet (25 mg total) by mouth daily as  needed for anxiety. 90 tablet 3     Lactobac no.41/Bifidobact no.7 (PROBIOTIC-10 ORAL) Take by mouth.       multivit with iron,minerals (MULTIVITAMIN WITH IRON-MINERAL ORAL) Take 2 tablets by mouth daily.       naltrexone (DEPADE) 50 mg tablet Take 0.5 tablets (25 mg total) by mouth 2 (two) times a day. 90 tablet prn     omeprazole (PRILOSEC) 40 MG capsule Take 1 capsule (40 mg total) by mouth daily. 90 capsule prn     phentermine (ADIPEX-P) 37.5 mg tablet Take 1/2 to 1 tablet in the morning. 90 tablet 1     venlafaxine 150 mg TR24 Take 1 tablet by mouth daily. 90 each 3     azithromycin (ZITHROMAX) 500 MG tablet Take 2 tablets today, and 2 tablets after 1 week 4 tablet 0     No current facility-administered medications for this visit.          Objective:    Vitals:    09/25/20 0955   BP: 106/64   Pulse: 78   Resp: 20   Temp: 98  F (36.7  C)   SpO2: 97%       Gen:  NAD, VSS  Lungs:  normal  Heart:  normal  Abdomen:  No HSM or mass.  Mild left lower quadrant tenderness, with no rebound or guarding.  Back without CVA tenderness  Pelvic: No inguinal adenopathy.  External genitalia normal.  Bimanual exam demonstrates cervical motion tenderness.  Uterus normal size.  Ovaries normal.        ADDITIONAL HISTORY SUMMARIZED (2): None.  DECISION TO OBTAIN EXTRA INFORMATION (1): None.   RADIOLOGY TESTS (1): None.  LABS (1): None.  MEDICINE TESTS (1): None.  INDEPENDENT REVIEW (2 each): None.     Total Data Points: 0

## 2021-06-12 NOTE — PROGRESS NOTES
Subjective:   Yossi comes in today for check of her anxiety and depression.  She is on venlafaxine.  She also is on BuSpar.  She has had no stressors in her life.  She is trying to go through her father's estate and that is stressful.  She also is any relationship which she feels is not going as well as she would like.  She is thinking about ending the relationship and that again is a stress to her.  She seems to be sleeping okay.  Her appetite is fine.  She is getting through work without any problems.  She wonders whether a change in medicines may be necessary to help her mood.  She did start counseling which has helped her quite a bit.      Objective:  HEENT: Pupils equally round reactive to light.  Pharynx clear.  Neck: No thyromegaly noted.  Lungs: Lungs are clear.  Patient's in no respiratory distress.  Cardiac: There is a regular rhythm present.  No ectopy heard.  No murmur present.  Neurologic: Cranial nerves all are intact.  Patient is alert and oriented.  Speech is clear.  Motor and sensory exams normal.      Assessment:  1.  Depression fairly well controlled on present medications  2.  Anxiety which is been worsening with present stressors.      Plan:  I encouraged the patient to continue a good well-balanced diet as well as get plenty of rest.  She also start an aerobic exercise program.  She will continue counseling.  We will increase her BuSpar to 15 mg twice daily.  Continue venlafaxine at 150 mg twice daily.  Follow-up here in 1-2 months for recheck.  Lab work will be done today.  The patient does have some lab work also scheduled for her gastric surgery that she had.

## 2021-06-12 NOTE — PROGRESS NOTES
Bariatric Follow Up Visit with a History of Previous Bariatric Surgery     Date of visit: 7/31/2017  Physician: Janelle Flores MD  Primary Care Provider:  MD Yossi Hilario   37 y.o.  female    Date of Surgery: 9/15/2016  Initial Weight: 243#  Initial BMI:   Today's Weight:   Wt Readings from Last 1 Encounters:   07/31/17 153 lb (69.4 kg)     Body mass index is 25.46 kg/(m^2).      Assessment and Plan     Assessment: Yossi is a 37 y.o. year old female who is almost 3 yrs s/p  Frankie en Y Gastric Bypass with Dr. Sukhjinder Sky feels as if she has achieved the goals she hoped to accomplish through bariatric surgery and weight loss.    Encounter Diagnoses   Name Primary?     History of Frankie-en-Y gastric bypass      Hyperphagia      Abnormal craving      Postoperative malabsorption Yes         Current Outpatient Prescriptions:      busPIRone (BUSPAR) 10 MG tablet, TAKE 1 TABLET(10 MG) BY MOUTH TWICE DAILY, Disp: 180 tablet, Rfl: 0     clindamycin (CLEOCIN) 300 MG capsule, TAKE 1 CAPSULE(300 MG) BY MOUTH THREE TIMES DAILY FOR 10 DAYS, Disp: 30 capsule, Rfl: 0     docusate sodium (COLACE) 250 MG capsule, Take 500 mg by mouth daily., Disp: , Rfl:      omeprazole (PRILOSEC) 40 MG capsule, Take 1 capsule (40 mg total) by mouth daily., Disp: 90 capsule, Rfl: prn     phentermine (ADIPEX-P) 37.5 mg tablet, Take 1/2 to 1 tablet in the morning., Disp: 90 tablet, Rfl: 1     venlafaxine (EFFEXOR) 75 MG tablet, TAKE 2 TABLETS BY MOUTH TWICE DAILY, Disp: 120 tablet, Rfl: 0     betamethasone dipropionate (DIPROLENE) 0.05 % ointment, Apply 1 application topically 2 (two) times a day as needed (hemmorroids). , Disp: , Rfl:      biotin 5 mg cap, Take 1 capsule by mouth daily., Disp: , Rfl:      calcium-vitamin D (CALCIUM-VITAMIN D) 500 mg(1,250mg) -200 unit per tablet, Take 2 tablets by mouth daily. , Disp: , Rfl:      cholecalciferol, vitamin D3, 1,000 unit tablet, Take 5,000 Units by mouth daily. , Disp: ,  Rfl:      cyanocobalamin 1000 MCG tablet, Place 1,000 mcg under the tongue daily. , Disp: , Rfl:      cycloSPORINE (RESTASIS) 0.05 % ophthalmic emulsion, Administer 1 drop to both eyes 2 (two) times a day. , Disp: , Rfl:      hydrOXYzine (VISTARIL) 25 MG capsule, Take 1-2 tablets every 6 hours as needed for itching., Disp: 20 capsule, Rfl: 0     mupirocin (BACTROBAN) 2 % cream, APPLY TOPICALLY TO THE AFFECTED AREA(S) THREE TIMES DAILY., Disp: 30 g, Rfl: 0     mupirocin (BACTROBAN) 2 % nasal ointment, small amount in each nostril 3-4 times daily for five (5) days. After application, press sides of nose together and gently massage., Disp: 10 g, Rfl: 1     naltrexone (DEPADE) 50 mg tablet, Take 0.5 tablets (25 mg total) by mouth 2 (two) times a day., Disp: 90 tablet, Rfl: prn     NECON 7/7/7, 28, 0.5/0.75/1 mg- 35 mcg per tablet, TAKE 1 TABLET BY MOUTH DAILY, Disp: 84 tablet, Rfl: 0     therapeutic multivitamin (THERAGRAN) tablet, Take 2 tablets by mouth daily. , Disp: , Rfl:      traZODone (DESYREL) 100 MG tablet, Take 1 tablet (100 mg total) by mouth bedtime. 1-2 tabs, Disp: 30 tablet, Rfl: 5    Plan:  Refill phentermine, omeprazole and naltrexone. Restart D and B-12 now and MVI when able.    Return in about 6 months (around 1/31/2018).    Bariatric Surgery Review     Interim History/LifeChanges: not taking vitamins at all for a couple/few weeks d/t crampy abdominal pain  Snacking and eating more d/t stress. Made an appt. With her counselor. Not sleeping well.    Patient Concerns: needs refills of phentermine, omeprazole and naltrexone    Medication changes: see above    Vitamin Intake:   B-12   none (has SL)   MVI  none ([place])   Vitamin D  none   Calcium   none     Other                LABS: ordered    Nausea no  Vomiting no  Constipation once a week-takes colace  Diarrhea no  Rashes no  Hair Loss no  Calf tenderness no  Breathing difficulty no  Reactive Hypoglycemia yes  Light Headedness no   Moods  stressed-seeing someone soon.    12 point ROS as above and otherwise negative      Habits:  Alcohol: 0-1  Tobacco: yes 10ppd  Caffeine 1-2/d  NSAIDS no  Exercise Routine: walking at work.  Looking in to [employer]  3 meals/day yes  Protein first yes  60grams/day  Water Separate from meals yes  Calorie Containing Beverages no  Restaurant eating/wk 1-2  Sleeping not well, 4-5/stress  Stress high/father  relationship issues  CPAP: NA  Contraception: OCP    Social History     Social History     Social History     Marital status: Single     Spouse name: N/A     Number of children: N/A     Years of education: N/A     Occupational History     Not on file.     Social History Main Topics     Smoking status: Former Smoker     Start date: 3/18/2015     Quit date: 2016     Smokeless tobacco: Never Used     Alcohol use Yes      Comment: once a month     Drug use: No     Sexual activity: No     Other Topics Concern     Not on file     Social History Narrative       Past Medical History     Past Medical History:   Diagnosis Date     Anal fissure      Bacterial vaginosis     only once     Depression      External hemorrhoids without mention of complication      Herpes simplex      Migraine, unspecified, without mention of intractable migraine without mention of status migrainosus      Morbid obesity      Obesity, unspecified      Other and unspecified postsurgical nonabsorption 6/10/2015     Other malaise and fatigue      Recurrent UTI      Tobacco use 6/10/2015     Unspecified asthma(493.90)      Problem List     Patient Active Problem List   Diagnosis     Fatigue     Anal Fissure     Recurrent Major Depression In Partial Remission     Depression     Intermittent Asthma     Foot Pain (Soft Tissue)     Herpes Simplex Type II     Anxiety     Tension-type Headache     Obesity     Migraine Headache     Morbid obesity     Other and unspecified postsurgical nonabsorption     Tobacco use     Stress at  work     Medications     Current Outpatient Prescriptions   Medication Sig     busPIRone (BUSPAR) 10 MG tablet TAKE 1 TABLET(10 MG) BY MOUTH TWICE DAILY     clindamycin (CLEOCIN) 300 MG capsule TAKE 1 CAPSULE(300 MG) BY MOUTH THREE TIMES DAILY FOR 10 DAYS     docusate sodium (COLACE) 250 MG capsule Take 500 mg by mouth daily.     omeprazole (PRILOSEC) 40 MG capsule Take 1 capsule (40 mg total) by mouth daily.     phentermine (ADIPEX-P) 37.5 mg tablet Take 1/2 to 1 tablet in the morning.     venlafaxine (EFFEXOR) 75 MG tablet TAKE 2 TABLETS BY MOUTH TWICE DAILY     betamethasone dipropionate (DIPROLENE) 0.05 % ointment Apply 1 application topically 2 (two) times a day as needed (hemmorroids).      biotin 5 mg cap Take 1 capsule by mouth daily.     calcium-vitamin D (CALCIUM-VITAMIN D) 500 mg(1,250mg) -200 unit per tablet Take 2 tablets by mouth daily.      cholecalciferol, vitamin D3, 1,000 unit tablet Take 5,000 Units by mouth daily.      cyanocobalamin 1000 MCG tablet Place 1,000 mcg under the tongue daily.      cycloSPORINE (RESTASIS) 0.05 % ophthalmic emulsion Administer 1 drop to both eyes 2 (two) times a day.      hydrOXYzine (VISTARIL) 25 MG capsule Take 1-2 tablets every 6 hours as needed for itching.     mupirocin (BACTROBAN) 2 % cream APPLY TOPICALLY TO THE AFFECTED AREA(S) THREE TIMES DAILY.     mupirocin (BACTROBAN) 2 % nasal ointment small amount in each nostril 3-4 times daily for five (5) days. After application, press sides of nose together and gently massage.     naltrexone (DEPADE) 50 mg tablet Take 0.5 tablets (25 mg total) by mouth 2 (two) times a day.     NECON 7/7/7, 28, 0.5/0.75/1 mg- 35 mcg per tablet TAKE 1 TABLET BY MOUTH DAILY     therapeutic multivitamin (THERAGRAN) tablet Take 2 tablets by mouth daily.      traZODone (DESYREL) 100 MG tablet Take 1 tablet (100 mg total) by mouth bedtime. 1-2 tabs     Surgical History     Past Surgical History  She has a past surgical history that includes  "lap,cholecystectomy; [place] tooth extraction; lap gastric bypass/frankie-en-y (N/A, 9/15/2014); and Frankie-en-y procedure.    Objective-Exam     Constitutional:  /59 (Patient Site: Left Arm, Patient Position: Sitting, Cuff Size: Adult Regular)  Pulse 97  Resp 18  Ht 5' 5\" (1.651 m)  Wt 153 lb (69.4 kg)  SpO2 100%  BMI 25.46 kg/m2  Height: 5' 5\" (1.651 m) (7/31/2017 11:13 AM)  Initial Weight: 243.5 lbs (10/4/2016 10:41 AM)  Weight: 153 lb (69.4 kg) (7/31/2017 11:13 AM)  Weight loss from initial: 90.5 (10/4/2016 10:41 AM)  % Weight loss: 37.17 % (10/4/2016 10:41 AM)  BMI (Calculated): 25.5 (7/31/2017 11:13 AM)  SpO2: 100 % (7/31/2017 11:13 AM)  NSAIDS: No (10/4/2016 10:41 AM)  Drinks per week: 0 (10/4/2016 10:41 AM)  Pain Scale: 0 (10/4/2016 10:41 AM)  General:  Pleasant and in no acute distress   Eyes:  EOMI  ENT:  Airway 2+  Moist mucous membranes  Neck:  Supple, No LAD, No thyromegaly, No carotid bruits appreciated  Respiratory: Normal respiratory effort, no cough, wheezes or crackles  CV:  Regular rate and Rhythm,no murmurs, pulses 2+, no calf tenderness, no LE edema  Gastrointestinal: Abdomen NT/ND, BS+  Musculoskeletal: muscle mass WNL  Skin: color fair hair full, incisions nicely healed  Neurological: No tremor, normal gait  Psychiatric: alert and oriented X3, mood and affect normal    Counseling     We reviewed the important post op bariatric recommendations:  -eating 3 meals daily  -eating protein first, getting >60gm protein daily  -eating slowly, chewing food well  -avoiding/limiting calorie containing beverages  -drinking water 15-30 minutes before or after meals  -choosing wheat, not white with breads, crackers, pastas, gonzalo, bagels, tortillas, rice  -limiting restaurant or cafeteria eating to twice a week or less    We discussed the importance of restorative sleep and stress management in maintaining a healthy weight.  We discussed the National Weight Control Registry healthy weight maintenance " strategies and ways to optimize metabolism.  We discussed the importance of physical activity including cardiovascular and strength training in maintaining a healthier weight.    We discussed the importance of life-long vitamin supplementation and life-long  follow-up.    Yossi was reminded that, to avoid marginal ulcers she should avoid tobacco at all, alcohol in excess, caffeine in excess, and NSAIDS (unless indicated for cardioprotection or othewise and opposed by a PPI).    Janelle Flores MD, [facility].  7/31/2017  11:29 AM     30 minutes spent with patient. >50% in counseling and coordination of care.

## 2021-06-15 NOTE — PROGRESS NOTES
Subjective:   Yossi comes in today with multiple concerns.  She has had a lot of head congestion.  She is having headaches.  Her ears are ringing and popping.  She has had symptoms over 10 days.  She feels like they are getting worse.  There is a yellow-green purulent drainage coming.  She coughs up a lot of this in the morning as she feels it drained through the back of the throat all night long.  It is difficult to sleep because of this as well.  She also is here because of some painful external hemorrhoids.  She occasionally will get some blood on the toilet paper after a bowel movement.  She has been having problems with this over the last 1 or 2 months off and on.  She would like to get back on birth control.  She recently discontinued this but would like to restart now.  She also is occasionally getting hair follicles that get infected.  These seem to be over the entire body.  She has been diagnosed with MRSA in the past.  He takes quite a while for the source to resolve.      Objective:  HEENT: Both TMs appear gray.  No erythema noted.  The sinuses are definitely tender over both frontal and both maxillary areas.  Conjunctivae clear.  Pharynx reveals minimal erythema but there is mucus draining down posteriorly from the nasopharynx.  Neck: No significant lymphadenopathy noted anteriorly.  No posterior adenopathy present.  Lungs: A few expiratory wheezes heard in both lungs but good airflow noted.  Patient is in no respiratory distress.  No rales are present.  Skin: There is an inflamed follicle in the left lower quadrant of the abdomen.  There is little bit of a scar in this area noted.  No exudative process present.  Rectal: There are 2 small external hemorrhoids present.  These are not bleeding.  He did not appear thrombosed.      Assessment:  1.  Maxillary and frontal sinusitis  2.  MRSA folliculitis  3.  External hemorrhoids      Plan:  The patient will use betamethasone ointment for hemorrhoids.  Start  Augmentin 875 mg twice daily for her sinus infections.  She will take Mucinex as well.  She will try to take extra liquids.  Start Bactroban for her sores in the abdominal area and any new lesions which may arise.  She will be started on birth control as well.  She did have some breakthrough bleeding so we will start Ortho-Novum 1/50.  I instructed her in side effects of this.  Follow-up here if symptoms persist.

## 2021-06-16 NOTE — PROGRESS NOTES
Subjective:   Yossi comes in today complaining of left ear pain.  Both ears been full and plugged but the left one has started to pain quite a bit.  She states this is the fourth time in the last year that she has had symptoms like this.  She also complains of headaches.  She denies sore throat.  She has had some chills recently.  Temp today was 99 3.  She denies significant nasal congestion.  She denies significant cough of any type.  She wonders why she keeps getting these symptoms.  She has tried Sudafed which seems to help some of her symptoms.  She also uses Tylenol and ibuprofen for pain control.  She denies any drainage out of the ears themselves.     Objective:   HEENT: Conjunctiva clear.  Nasal mucosa minimally inflamed.  Good airflow noted to the nares.  Both TMs are gray.  The left TM appears to be bulging but there is no erythema noted.  No exudate present.  Sinuses are nontender over frontal or maxillary areas.  Pharynx today is clear.  Neck: Neck reveals no significant lymphadenopathy.  No tenderness noted in the mastoid areas.  Lungs: Lungs today are clear.  No rales or wheezes heard.  Good airflow noted today.  Patient had minimal cough throughout the exam.  Cardiac: No murmur heard.      Assessment:  1.  Bilateral eustachian tube dysfunction with left-sided ear pain      Plan:  Patient will continue her Sudafed.  Add Mucinex.  We will add azithromycin if symptoms worsen.  She will be referred to otolaryngology for further evaluation of her recurrent symptoms.

## 2021-06-17 NOTE — PATIENT INSTRUCTIONS - HE
Patient Instructions by Janelle Reza MD at 10/31/2019  1:30 PM     Author: Janelle Reza MD Service: -- Author Type: Physician    Filed: 10/31/2019  2:10 PM Encounter Date: 10/31/2019 Status: Addendum    : Janelle Reza MD (Physician)    Related Notes: Original Note by Janelle Reza MD (Physician) filed at 10/31/2019  1:59 PM             Take your vitamins with consistency  Labs today.  Protect your sleep

## 2021-06-17 NOTE — PROGRESS NOTES
HPI: This patient is a 37yo F who presents to the clinic for evaluation of episodic ear pain at the request of Dr. Nava. The pain is a pressure sensation in and around the ear that comes and goes along with some throbbing and stabbing pain as well. When this occurs, she frequently wakes with it in the morning. She has been diagnosed with 4 ear infections in the last year as a result of these symptoms. She has no ear history in terms of infections, surgeries, or other indicators of an underlying ear problem. There is no known clenching or grinding behaviors. Denies otorrhea, hearing loss, tinnitus, vertigo, and other major symptoms such as fever, weight loss, odynophagia, dysphagia, and hemoptysis.     Past medical history, surgical history, social history, family history, medications, and allergies have been reviewed with the patient and are documented above.    Review of Systems: a 10-system review was performed. Pertinent positives are noted in the HPI and on a separate scanned document in the chart.    PHYSICAL EXAMINATION:  GEN: no acute distress, normocephalic  EYES: extraocular movements are intact, pupils are equal and round. Sclera clear.   EARS: auricles are normally formed. The external auditory canals are clear with minimal to no cerumen. Tympanic membranes are intact bilaterally with no signs of infection, effusion, retractions, or perforations.  NOSE: anterior nares are patent. There are no masses or lesions. The septum is non-obstructing.  OC/OP: clear, dentition is in good repair but with flattening and wear facets. The tongue and palate are fully mobile and symmetric. No masses or lesions  NECK: soft and supple. No lymphadenopathy or masses. Airway is midline. Tenderness of the bilateral TMJ.  NEURO: CN VII intact bilaterally. alert and oriented. No spontaneous nystagmus. Gait is normal.  PULM: breathing comfortably on room air, normal chest expansion with respiration  HEART: no cyanosis or  clubbing    AUDIOGRAM: normal hearing, type A tymps, 100% wrs    MEDICAL DECISION-MAKING: This patient is a 37yo F with ear discomfort from TMD.  Given her exam findings and lack of any ear history, it is not likely that the ears are responsible for her discomfort. Discussed soft diet, no gum chewing, and a bite guard. Can follow-up with a dentist for further management as needed.

## 2021-06-17 NOTE — PROGRESS NOTES
Hearing evaluation in conjunction with ENT exam (Dr. Marquez)    History:  Recurrent otitis media, aural fullness, and otalgia over the past year; ears felt plugged upon awakening this morning, and she feels another infection may be starting. She reported that she's been taking Mucinex, Flonase, and Zyrtec regularly and feels this improves her symptoms. She denied hearing loss, tinnitus, true vertigo, recent illness, or history of noise exposure. She denied known TMD; was chewing gum during today's appointment.    Results:  Otoscopy was unremarkable in either ear. Hearing sensitivity was assessed with good reliability using insert phones.      Normal hearing sensitivity, bilaterally, for 250-8000 Hz.    Speech reception thresholds showed agreement with pure tone findings in each ear. Word recognition ability was excellent, bilaterally, with presentation levels at typical conversational volume in both ears.    Tympanometry was consistent with normal middle ear function, bilaterally.    Recommendations:  Follow-up with ENT; retest hearing annually (to monitor) or per medical management.  Wear hearing protection consistently in noise.     Lorraine Shabazz, [facility]  [place] Licensed Audiologist [id number]

## 2021-06-19 NOTE — LETTER
Letter by Sunny Cowart MD at      Author: Sunny Cowart MD Service: -- Author Type: --    Filed:  Encounter Date: 4/23/2019 Status: (Other)               [address]  [place] [address]   PHONE [phone]  [phone]     April 23, 2019  Yossi Sky  [address]  [place] [address]    Dear Yossi:    Below are the results from your recent visit.  Your results look good, normal PAP and negative HPV testing.    Resulted Orders   Gynecologic Cytology (PAP Smear)   Result Value Ref Range    Case Report       Gynecologic Cytology Report                       Case: [id number]                                   Authorizing Provider:  Sunny Cowart MD         Collected:           04/16/2019 1648              Ordering Location:     [facility]            Received:            04/16/2019 1648                                     Medicine/OB                                                                  First Screen:          Johana Padgett, CT                                                                               (ASCP)                                                                       Specimen:    SUREPATH PAP, SCREENING, Endocervical/cervical                                             Interpretation  Negative for squamous intraepithelial lesion or malignancy.      Negative for squamous intraepithelial lesion or malignancy    Result Flag Normal Normal    Specimen Adequacy       Satisfactory for evaluation, endocervical/transformation zone component present    HPV Reflex? Yes regardless of result     HIGH RISK No     LMP/Menopause Date 4-6-19     Abnormal Bleeding No     Pt Status na     Birth Control/Hormones None     Previous Normal/Date 2016     Prev Abn Date/Dx none known     Cervical Appearance normal    Wet Prep, Vaginal   Result Value Ref Range    Yeast Result No yeast seen No yeast seen    Trichomonas No Trichomonas seen No Trichomonas  seen    Clue Cells, Wet Prep No Clue cells seen No Clue cells seen   Chlamydia trachomatis & Neisseria gonorrhoeae, Amplified Detection   Result Value Ref Range    Chlamydia trachomatis, Amplified Detection Negative Negative    Neisseria gonorrhoeae, Amplified Detection Negative Negative   HPV High Risk DNA Cervical   Result Value Ref Range    HPV Source SurePath     HPV16 DNA Negative NEG    HPV18 DNA Negative NEG    Other HR HPV Negative NEG    Final Diagnosis SEE NOTES       Comment:      This patient's sample is negative for HPV DNA.  This test was developed and its performance characteristics determined by the  [facility], [facility]. It  has not been cleared or approved by the FDA. The laboratory is regulated under  CLIA as qualified to perform high-complexity testing. This test is used for  clinical purposes. It should not be regarded as investigational or for  research.  (Note)  METHODOLOGY:  The Roche dante 4800 system uses automated extraction,  simultaneous amplification of HPV (L1 region) and beta-globin,  followed by  real time detection of fluorescent labeled HPV and beta  globin using specific oligonucleotide probes . The test specifically  identifies types HPV 16 DNA and HPV 18 DNA while concurrently  detecting the rest of the high risk types (31, 33, 35, 39, 45, 51,  52, 56, 58, 59, 66 or 68).    COMMENTS:  This test is not intended for use as a screening device  for women under age 30 with normal cervical   cytology.  Results should  be correlated with cytologic and histologic findings. Close clinical  followup is recommended.        Specimen Description Cervical Cells       Comment:      Performed and/or entered by:  [address] [address]          If you have any questions or concerns, please do not hesitate to call.    Sincerely,      Sunny Cowart MD  April 23, 2019

## 2021-06-19 NOTE — PROGRESS NOTES
4 post-op RNY  lab orders placed for patient and will be done at a HE lab  in preparation for appointment with Janelle Flores MD on 8/14/2018.    Marilyn Harris RN, N  [facility]

## 2021-06-19 NOTE — PROGRESS NOTES
Patient:   ARTHUR GONZALEZ            MRN: [id number]            FIN: [id number]               Age:   3 days     Sex:  MALE     :  17   Associated Diagnoses:   Term  delivered by , current hospitalization; Jittery    Author:   MASOUD KHANNA      Basic Information   Patient Information:  Date of admission  2017, Dr. Jolley called today afternoon to discharge this baby as she is unable to come to hospital today.  Baby doing well in hospital, breast and formula feeding, voiding and stooling. Baby is jittery, his accucheks are stable. His urine tox screen is negative.         Growth parameters: Birth Measurements   17 13:24           Birth Weight              4.145 kg     ,   I & O between:  2017 14:45 TO 2017 14:45  Med Dosing Weight:  4.145  kg   2017  24 Hour Intake:   88.00  ( 21.23 mL/kg )  Breastfeeding Count:   8  24 Hour Output:   0.00           24 Hour Urine/Stool Output:   0.0  24 Hour Balance:   88.00           24 Hour Urine Output:   0.00  ( 0.00 mL/kg/hr )                   Urine Count:  3.00    Stool Count:  2.00        Dosing Weight:   4.145 kg    2017 10:02  Most Recent Clinical Weight:   3.770  kg    2017 13:24    Previous Clinical Weight:   3.805  kg 2017 00:00  Changed:   -   %0.92    35.00 gm   wt loss since birth -9%.    Present at bedside:  Mother, Father.       Review of Systems   Not applicable:  Patient is .       Health Status   Allergies:    Allergic Reactions (Selected)  NKA   Current medications:    Medications (5) Active  Scheduled: (2)  Lidocaine 4% cream 5 gm  1 application, Topical, On Call  Lidocaine PF 1% 20 mg/2 mL inj  5 mg 0.5 mL, Infiltration, On Call  Continuous: (0)  PRN: (3)  Sucrose 24% 15 mL oral liquid UD  2 mL, Oral, As Directed PRN  Vitamin A & D ointment 60 gm  1 application, Topical, As Directed PRN  Vitamin A & D ointment 60 gm  1 application, Topical, As Directed PRN       Bariatric Follow Up Visit with a History of Previous Bariatric Surgery     Date of visit: 8/14/2018  Physician: Janelle Flores MD  Primary Care Provider:  MD Yossi Hilario   38 y.o.  female    Date of Surgery: 9/15/2014  Initial Weight: 243#  Initial BMI:   Today's Weight:   Wt Readings from Last 1 Encounters:   08/14/18 152 lb (68.9 kg)     Body mass index is 25.29 kg/(m^2).      Assessment and Plan     Assessment: Yossi is a 38 y.o. year old female who is 4 yrs s/p  Frankie en Y Gastric Bypass with Dr. Sukhjinder Sky feels as if she has achieved the goals she hoped to accomplish through bariatric surgery and weight loss.    Encounter Diagnoses   Name Primary?     Postoperative malabsorption Yes     Tobacco use      History of Frankie-en-Y gastric bypass      Hyperphagia      Abnormal craving          Current Outpatient Prescriptions:      ALPRAZolam (XANAX) 1 MG tablet, Take 1 tablet (1 mg total) by mouth 3 (three) times a day as needed for anxiety., Disp: 90 tablet, Rfl: 0     betamethasone dipropionate (DIPROLENE) 0.05 % ointment, Apply 1 application topically 2 (two) times a day as needed (hemmorroids)., Disp: 30 g, Rfl: 2     busPIRone (BUSPAR) 15 MG tablet, TAKE 1 TABLET(10 MG) BY MOUTH TWICE DAILY, Disp: 180 tablet, Rfl: 3     cetirizine (ZYRTEC) 10 MG tablet, Take 20 mg by mouth daily., Disp: , Rfl:      venlafaxine (EFFEXOR) 75 MG tablet, Take 2 tablets (150 mg total) by mouth 2 (two) times a day., Disp: 360 tablet, Rfl: 2     mupirocin (BACTROBAN) 2 % cream, APPLY TOPICALLY TO THE AFFECTED AREA(S) THREE TIMES DAILY., Disp: 30 g, Rfl: 5     naltrexone (DEPADE) 50 mg tablet, Take 0.5 tablets (25 mg total) by mouth 2 (two) times a day., Disp: 90 tablet, Rfl: prn     omeprazole (PRILOSEC) 20 MG capsule, Take 1 capsule (20 mg total) by mouth daily., Disp: 90 capsule, Rfl: prn     phentermine (ADIPEX-P) 37.5 mg tablet, Take 1/2 to 1 tablet in the morning., Disp: 90 tablet, Rfl:    Histories      Maternal History   Include maternal history : OB DOCUMENTATION FLOWSHEET   17 07:32 Date/Time of Birth 17 07:32     Delivery Type Birth       Presentation at Delivery Cephalic     EGA at Birth 39 3/7 weeks     Reason for  Failure to Progress     Gender Male     Birth Length 56 cm     Birth Weight 4.145 kg     Size For Gestational Age (or GA) Large For Gestational Age    17 17:26 Maternal Rupture Of Membranes Type Spontaneous Rupture     Maternal Rupture Of Membranes Date/Time 17 17:26     Maternal Amniotic Fluid Color Clear, Blood Tinged    17 19:55 Maternal  1    17 19:55 Maternal Risk Factors in Utero Advanced Maternal Age, Macrosomia/Large for Gestational Age, Other: Pt has Charcot annemarie tooth syndrome,pt on Lexapro,Remeron (Modified)    Feeding Preference Exclusive Breast Milk     Maternal  1     Reason for Maternal Fetal Med Consult Other: for level II,ama (Modified)    Maternal Blood Type Transcribed O Positive     Maternal HBsAg Transcribed Negative     Maternal RPR Transcribed Non Reactive     Maternal Rubella Transcribed Immune     Maternal HIV Transcribed Negative     Maternal Group B Strep Transcribed Negative          Physical Examination   VS/Measurements   Measurements from flowsheet : Height and Weight   17 13:24 CLINICALWEIGHT 3.770 kg   17 00:19 CLINICALWEIGHT 3.770 kg    Weight Method Measured    Weight Scale Infant Scale   17 00:00 CLINICALWEIGHT 3.805 kg    Weight Method Measured    Weight Scale Infant Scale      ,      Vitals between:   2017 14:45:19   TO   2017 14:45:19                   LAST RESULT MINIMUM MAXIMUM  Temperature 36.9 36.7 36.9  Heart Rate 136 116 136  Respiratory Rate 32 32 44     General:  No acute distress, Alert, Responsive, jittery.    Eye:  Pupils are equal, round and reactive to light.         Red reflex: Bilaterally, Present.    HENT:  Normocephalic,  1    Plan: Restart omeprazole. RX omeprazole, phentermine and naltrexone to use prn to help maintain the 90# lost.  Take a walk a day. Strength training will help metabolism, strength and bone density    Return in about 6 months (around 2/14/2019).    Bariatric Surgery Review     Interim History/LifeChanges: smoking again. MVI sporadic d/t nausea. Stress with boyfriend. Seeing therapist. Oscar worked previously. Made the taste terrible. Working on stress and pre contemplative stage for cessation. Has tried call it quits.    Patient Concerns: 4 yr f/u, needs refills  Appetite (1-10): 5  GERD: off omeprazole for a few days. Ran out.    Medication changes: anxiolytic d/t boyfriend issues    Vitamin Intake:   B-12   SL   MVI  sporadic   Vitamin D  none   Calcium   citrate with D     Other                LABS: ordered    Nausea no  Vomiting no  Constipation managing  Diarrhea no  Rashes no  Hair Loss no  Calf tenderness no  Breathing difficulty no  Reactive Hypoglycemia yes  Light Headedness yes   Moods stressed, new house and boyfriend issues    12 point ROS as above and otherwise negative      Habits:  Alcohol: 0-2  Tobacco: 1/2ppd  Caffeine 2/wk  NSAIDS no  Exercise Routine: none. Does some walking and gardening  3 meals/day yes  Protein first yes  60grams/day  Water Separate from meals yes  Calorie Containing Beverages no  Restaurant eating/wk 0-1  Sleeping not now-stress and anxiety  Stress high  CPAP: NA  Contraception: abstinence    Social History     Social History     Social History     Marital status: Single     Spouse name: N/A     Number of children: N/A     Years of education: N/A     Occupational History     Not on file.     Social History Main Topics     Smoking status: Current Every Day Smoker     Packs/day: 0.50     Start date: 3/18/2015     Smokeless tobacco: Never Used     Alcohol use Yes      Comment: once a month     Drug use: No     Sexual activity: No     Other Topics Concern     Not on file      Social History Narrative       Past Medical History     Past Medical History:   Diagnosis Date     Anal fissure      Bacterial vaginosis     only once     Depression      External hemorrhoids without mention of complication      Herpes simplex      Migraine, unspecified, without mention of intractable migraine without mention of status migrainosus      Morbid obesity (H)      Obesity, unspecified      Other and unspecified postsurgical nonabsorption 6/10/2015     Other malaise and fatigue      Recurrent UTI      Tobacco use 6/10/2015     Unspecified asthma(493.90)      Problem List     Patient Active Problem List   Diagnosis     Fatigue     Anal Fissure     Recurrent major depressive disorder, in partial remission (H)     Depression     Intermittent Asthma     Foot Pain (Soft Tissue)     Herpes Simplex Type II     Anxiety     Tension-type Headache     Obesity     Migraine Headache     Morbid obesity (H)     Other and unspecified postsurgical nonabsorption     Tobacco use     Stress at work     Medications     Current Outpatient Prescriptions   Medication Sig     ALPRAZolam (XANAX) 1 MG tablet Take 1 tablet (1 mg total) by mouth 3 (three) times a day as needed for anxiety.     betamethasone dipropionate (DIPROLENE) 0.05 % ointment Apply 1 application topically 2 (two) times a day as needed (hemmorroids).     busPIRone (BUSPAR) 15 MG tablet TAKE 1 TABLET(10 MG) BY MOUTH TWICE DAILY     cetirizine (ZYRTEC) 10 MG tablet Take 20 mg by mouth daily.     venlafaxine (EFFEXOR) 75 MG tablet Take 2 tablets (150 mg total) by mouth 2 (two) times a day.     mupirocin (BACTROBAN) 2 % cream APPLY TOPICALLY TO THE AFFECTED AREA(S) THREE TIMES DAILY.     naltrexone (DEPADE) 50 mg tablet Take 0.5 tablets (25 mg total) by mouth 2 (two) times a day.     omeprazole (PRILOSEC) 20 MG capsule Take 1 capsule (20 mg total) by mouth daily.     phentermine (ADIPEX-P) 37.5 mg tablet Take 1/2 to 1 tablet in the morning.     Surgical History  Anterior fontanelle open/soft/flat, Ears normally set and rotated, Palate intact.    Neck:  Supple, Clavicles intact.    Respiratory:  Lungs are clear to auscultation, Respirations are non-labored, Breath sounds are equal.    Cardiovascular:  Normal rate, Regular rhythm, No murmur, Normal peripheral perfusion.    Gastrointestinal:  Soft, Non-distended, No organomegaly, Anus patent.    Genitourinary:  Normal genitalia for age and sex.    Musculoskeletal     Normal range of motion.     No swelling.     No deformity.     No hip clicks.     Integumentary:  Warm, Dry, Jaundiced, erythema toxicum rash on face, trunk.    Neurologic:  Alert, Moves all extremities appropriately, No focal deficits.       Review / Management   Results review:  All Results   17 00:12 Glucose Bedside (POC) 78 mg/dL   02/15/17 17:10 Barbiturates Screen NEGATIVE    Benzodiazepine Screen NEGATIVE    U-Cannabinoids NEGATIVE    Cocaine Screen NEGATIVE    Opiate Screen NEGATIVE    Phencyclidine Screen NEGATIVE    Amphetamine Screen (UDINV) NEGATIVE   02/15/17 08:30 Bilirubin Total 5.9 mg/dL    Bilirubin, Direct 0.1 mg/dL   17 22:21 Glucose Bedside (POC) 65 mg/dL   17 19:05 Glucose Bedside (POC) 69 mg/dL   17 16:12 Glucose Bedside (POC) 77 mg/dL   17 12:30 Glucose Bedside (POC) 59 mg/dL   17 09:11 Glucose Bedside (POC) 56 mg/dL   .    Condition:  Stable.       Health Maintenance   Medication Administered:  Medication administered Phytonadione, and erythromycin 0.5% ophthalmic ointment applied in both eyes.       Impression and Plan   Diagnosis     Term  delivered by , current hospitalization (JOL95-UV Z38.01, Diagnosis, Hospitalized, Medical).     Jittery [place] (MIX62-GA P96.9, Diagnosis, Medical).     Course:  Progressing as expected.    Orders     -Breast/bottlefeed ad salomon q2-3hours. mom had breastfeeding issues, she is using a nipple shield. Breast feeding is improving, mom will follow up with  "    Past Surgical History  She has a past surgical history that includes pr lap,cholecystectomy; [place] tooth extraction; and pr lap gastric bypass/marlena-en-y (N/A, 9/15/2014).    Objective-Exam     Constitutional:  BP 97/53  Pulse 89  Resp 18  Ht 5' 5\" (1.651 m)  Wt 152 lb (68.9 kg)  SpO2 98%  BMI 25.29 kg/m2  Height: 5' 5\" (1.651 m) (8/14/2018  8:27 AM)  Weight: 152 lb (68.9 kg) (8/14/2018  8:27 AM)  BMI (Calculated): 25.3 (8/14/2018  8:27 AM)  SpO2: 98 % (8/14/2018  8:27 AM)  General:  Pleasant and in no acute distress   Eyes:  EOMI  ENT:  Airway 2+  Moist mucous membranes  Neck:  Supple, No LAD, No thyromegaly, No carotid bruits appreciated  Respiratory: Normal respiratory effort, no cough, wheezes or crackles  CV:  Regular rate and Rhythm,no murmurs, pulses 2+, no calf tenderness, no LE edema  Gastrointestinal: Abdomen NT/ND, BS+  Musculoskeletal: muscle mass WNL  Skin: color tan hair full, incisions nicely healed  Neurological: No tremor, normal gait  Psychiatric: alert and oriented X3, mood and affect normal    Counseling     We reviewed the important post op bariatric recommendations:  -eating 3 meals daily  -eating protein first, getting >60gm protein daily  -eating slowly, chewing food well  -avoiding/limiting calorie containing beverages  -drinking water 15-30 minutes before or after meals  -choosing wheat, not white with breads, crackers, pastas, gonzalo, bagels, tortillas, rice  -limiting restaurant or cafeteria eating to twice a week or less    We discussed the importance of restorative sleep and stress management in maintaining a healthy weight.  We discussed the National Weight Control Registry healthy weight maintenance strategies and ways to optimize metabolism.  We discussed the importance of physical activity including cardiovascular and strength training in maintaining a healthier weight.    We discussed the importance of life-long vitamin supplementation and life-long  follow-up.    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  - Bilirubin levels: 5.9 @ 24hr: low intermediate risk zone  - Hearing screen passed, cardiac screen passed and State Screen sent prior to discharge  -Dispo: will dc baby home with mother.  -Routine normal  care, Anticipatory guidance given  -pmd Dr. Jolley/Nhi  -Advised parents to follow up with pmd in 3 days on tuesday   .     Education and Follow-up:       Counseled: Family, Regarding treatment, Regarding medications, routine  care, feeding and jaundice.             Electronically Signed On 2017 19:30  __________________________________________________   MASOUD KHANNA     reminded that, to avoid marginal ulcers she should avoid tobacco at all, alcohol in excess, caffeine in excess, and NSAIDS (unless indicated for cardioprotection or othewise and opposed by a PPI).    Janelle Flores MD, [facility].  8/14/2018  9:14 AM      No images are attached to the encounter.   30 minutes spent with patient. >50% in counseling and coordination of care.

## 2021-06-19 NOTE — PROGRESS NOTES
Subjective:   Yossi comes in today for follow-up of her anxiety and depression.  She has been on venlafaxine 75 mg in the morning and 150 mg at bedtime.  She has been under undue stress recently.  She bought a house with her boyfriend.  The house has a lot of excessive work to do on it and she feels like it is never ending work.  Her boyfriend apparently is not helping as much in the work load as she would like.  There are water problems at the house.  This is made her feel almost hopeless in being able to fix it up.  She has not been sleeping well.  She has little energy.  She also has been worrying more about things.  She becomes irritable easily.  She is in counseling and she states that has helped her quite a bit.  She states she is having a hard time discussing issues with her boyfriend as it leads into arguments that she does not want to get into.      Objective:  HEENT: Pupils equally round and reactive to light.  Pharynx clear.  Neck: No thyromegaly noted.  Lungs: Lungs are clear.  Patient is in no respiratory distress.  Cardiac: There is a regular rhythm present.  No murmur heard.  Abdomen: Abdomen is soft and nontender.  Bowel sounds are active.  Neurologic: Cranial nerves all are intact.  Patient is alert and oriented.  She answers all questions appropriately.  Affect was flat.  She did become tearful when she started talking about some of her stressors that are bothering her.      Assessment:  1.  Generalized anxiety disorder worsened by present stressors  2.  Major depression      Plan:  Patient was encouraged to continue counseling.  She will increase her venlafaxine to 150 mg twice daily.  We will add alprazolam 1 mg up to 3 times daily as needed for significant anxiety.  She was instructed this would not be a long-term medication.  She understands that.  She realizes she can come into the clinic to discuss issues at any time she feels she needs that.  Greater than 25 minutes was spent with the  patient today.  Greater than 50% of time was spent in counseling on her anxiety and depression symptoms.

## 2021-06-19 NOTE — LETTER
Letter by Shana Shah MD at      Author: Shana Shah MD Service: -- Author Type: --    Filed:  Encounter Date: 9/4/2019 Status: (Other)         September 4, 2019     Patient: Yossi Sky   YOB: 1980   Date of Visit: 9/4/2019       To Whom it May Concern:    Yossi Sky was seen in my clinic on 9/4/2019.  Please excuse from work today and tomorrow.    If you have any questions or concerns, please don't hesitate to call.    Sincerely,         Electronically signed by Shana Shah MD

## 2021-06-20 NOTE — PROGRESS NOTES
Chief Complaint   Patient presents with     Fall     Fell down stairs yesterday      Arm Pain     Left side      Neck Pain     left side      Shoulder Pain     left side          HPI:   Yossi Sky is a 38 y.o. female fell down stairs onto carpet yesterday.  Neck pain, left arm pain, shoulder pain.  No numbness, tingling or weakness.  No LOC.  Used some tylenol.  Needs note for missing work today    ROS:  A 10 point comprehensive review of systems was negative except as noted.     Medications:  Current Outpatient Prescriptions on File Prior to Visit   Medication Sig Dispense Refill     ALPRAZolam (XANAX) 1 MG tablet Take 1 tablet (1 mg total) by mouth 3 (three) times a day as needed for anxiety. 90 tablet 0     betamethasone dipropionate (DIPROLENE) 0.05 % ointment Apply 1 application topically 2 (two) times a day as needed (hemmorroids). 30 g 2     busPIRone (BUSPAR) 15 MG tablet TAKE ONE TABLET BY MOUTH TWICE A  tablet 2     cetirizine (ZYRTEC) 10 MG tablet Take 20 mg by mouth daily.       mupirocin (BACTROBAN) 2 % cream APPLY TOPICALLY TO THE AFFECTED AREA(S) THREE TIMES DAILY. 30 g 5     naltrexone (DEPADE) 50 mg tablet Take 0.5 tablets (25 mg total) by mouth 2 (two) times a day. 90 tablet prn     omeprazole (PRILOSEC) 20 MG capsule Take 1 capsule (20 mg total) by mouth daily. 90 capsule prn     phentermine (ADIPEX-P) 37.5 mg tablet Take 1/2 to 1 tablet in the morning. 90 tablet 1     venlafaxine (EFFEXOR) 75 MG tablet Take 2 tablets (150 mg total) by mouth 2 (two) times a day. 360 tablet 2     No current facility-administered medications on file prior to visit.          Social History:  Social History   Substance Use Topics     Smoking status: Current Every Day Smoker     Packs/day: 0.50     Start date: 3/18/2015     Smokeless tobacco: Never Used     Alcohol use Yes      Comment: once a month         Physical Exam:   Vitals:    09/28/18 0838   BP: 104/60   Pulse: (!) 106   Resp: 16   Temp: 98.5  F  "(36.9  C)   TempSrc: Oral   SpO2: 98%   Weight: 152 lb (68.9 kg)   Height: 5' 5\" (1.651 m)       GEN:  NAD  LUNGS:  Clear to auscultation without wheezing.  Normal effort.  HEART:  RRR without murmur, rub or gallop   NECK:  No tenderness to percussion over spine.               No tenderness over paracervical muscles.               FROM   SHOULDER:  No tenderness to palpation  NEURO:  DTR's:  Triceps: 2/4  BL                                Bicips:  2/4  BL                               Brachioradialis:  2/4  BL                  MOTOR:  Finger Abduction/Adduction: 5/5  BL                                  Thumb Pincer:  5/5  BL                                   Wrist Flexion/Extension:  5/5  BL                                  Elbow Flexion/Extension:  5/5  BL                                  Shoulder Abduction/Adduction 5/5 BL                 Sensation intact to light touch BL   BACK:  No pain to percussion over LS spine.  No tenderness to palpation over muscles.  NEURO:  DTR's: Patellar  L 2/4  R 2/4                                Achilles  L  2/4  R 2/4                  Motor:  Great Toe Flexion L 5/5  R 5/5                                                  Extension L 5/5  R 5/5                              Ankle Flexion L 5/5  R 5/5                                        Extension L 5/5  R 5/5                              Knee Flexion  L 5/5  R 5/5                                        Extension  L 5/5  R 5/5                              Hip Abduction  L 5/5  R 5/5                                    Adduction   L 5/5  R 5/5                              Hip Flexion L 5/5  R 5/5                                     Extension L 5/5  R 5/5                  Sensation intact to light touch throughout both LE                   Straight leg raising negative BL         Assessment/Plan:    1. Multiple contusions     2. Muscle strain, multiple sites          No neurological findings.  Ice everything that hurts.  Ibuprofen 600 " mg up to 4 times daily  May also take tylenol 1000 mg up to 4 times daily.  Instructed in shoulder exercises.  Recheck for problems.    Note given for work.          Sherlyn Christina MD      9/28/2018    The following portions of the patient's history were reviewed and updated as appropriate: allergies, current medications, past family history, past medical history, past social history, past surgical history and problem list.

## 2021-06-21 NOTE — PROGRESS NOTES
Subjective:   Yossi comes in today with concerns of difficulty concentrating.  She states it takes her a long time to complete projects.  She seems to bounce from one project to another.  She states she has a lot of random things she has at home that do not get completed.  She is concerned about this.  She notices that this plays a role at her employment as well.  It takes her a long time to finish a job tasks.  She has seen a psychologist for this and is been evaluated for attention deficit.  It was their feeling that patient did have elements of attention deficit because she also had a lot of anxiety and mood disorder that those conditions may be making the diagnosis of less clear.  She is being treated for her mood disorder and anxiety.  She feels like those symptoms are fairly well controlled.  She thinks her appetite is good.  She is trying to improve her sleep pattern.  She has been under more stress recently.  She is breaking up with her boyfriend with whom she just bought a house and that has been a big stressor to her.      Objective:  HEENT: Pupils equally round and reactive to light.  Fundi are benign.  Pharynx is clear.  Neck: No thyromegaly present.  No bruits heard.  Lungs: Lungs today are clear bilaterally.  Patient is in no distress.  Cardiac: There is a regular rhythm present.  No murmur heard.  Neurologic: Patient is alert and oriented.  She answers all questions appropriately.  Motor strength is normal.  Sensory exam normal.  Cognition seemed appropriate today.  She was quite talkative about her situation today.      Assessment:  1.  Decreased attentiveness consistent with attention deficit disorder  2.  Generalized anxiety disorder  3.  History of depression worsened by increased stressors      Plan:  A long discussion today with the patient about attention deficit disorder.  Since she is being treated for her anxiety and depression fairly completely we have decided to start a trial of Adderall  for 1 month.  She will assess how this helps her throughout the day.  She will update me within the month with her progress.  If she feels better on this I feel we could continue this medication.  If we do continue it she will sign a controlled substance contract.  We discussed the side effects of this medication at length today.

## 2021-06-23 NOTE — TELEPHONE ENCOUNTER
Patient is scheduled for a medication check on 1/30/19 and an established care appointment on 2/21/19 but took her last pill today and needs this ASAP.

## 2021-06-23 NOTE — TELEPHONE ENCOUNTER
Reviewed chart. Patient was seen by Dr. Nava on 11/12/18 and started on trial of adderral- it appears per his note he wanted her to follow-up in 1 month. He did end up refilling the adderral for 1 more month, but since this is a new medication for her, she would need to be assessed prior to another refill (and complete controlled substance agreement). I also unfortunately cannot promise that a provider that she sees would prescribe this medication for her- for me, I typically would refer to psychiatry for evaluation and defer to them for prescribing these medications if indicated. Please let patient know this prior to her appointment with me, and I apologize for any inconvenience. Thanks

## 2021-06-23 NOTE — TELEPHONE ENCOUNTER
Controlled Substance Refill Request  Medication Name:   Requested Prescriptions     Pending Prescriptions Disp Refills     dextroamphetamine-amphetamine (ADDERALL XR) 20 MG 24 hr capsule 30 capsule 0     Sig: Take 1 capsule (20 mg total) by mouth daily.     Date Last Fill: 12/17/18  Pharmacy: [facility] [id number]      Submit electronically to pharmacy  Controlled Substance Agreement Date Scanned:   Encounter-Level CSA Scan Date:    There are no encounter-level csa scan date.       Last office visit with prescriber/PCP: Visit date not found OR same dept: 11/12/2018 Hood Nava MD OR same specialty: 11/12/2018 Hood Nava MD  Last physical: Visit date not found Last MTM visit: Visit date not found

## 2021-06-24 NOTE — PROGRESS NOTES
"SUBJECTIVE  Yossi Sky is a 39 y.o. female here for    Chief Complaint   Patient presents with     Establish Care     Her long-time provider, Dr. Nava recently retired and she is here today to establish care.    History of anxiety/depression: Has increased stressors recently due to breaking up with boyfriend but they had bought house together so she is still living with him until they figure out the mortgage. She is planning to find another house. 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 She does report some inattentiveness throughout life- she always had problems in school and feels like she is \"ping-ponging\" at work and it is difficult to finish things. She was referred to [facility] and had evaluation- reviewed this and they did not diagnose with ADHD but likely her symptoms are multifactorial, also related to anxiety. She was started on stimulant, adderral by PCP and used this for 1 month, then tried stronger dose, but she stopped since her PCP retired. She does not think the medication made much change to her behavior but she would like to try another medication. She will plan to go back to [facility] to establish psychiatric care there- she does have depression and anxiety and follows regularly (at least twice per month) with therapist and finds this very helpful. She is stable on venlafaxine and buspar. Of note, she was given short course of xanax by PCP recently due to stressors with boyfriend- this was not intended for long-term use - she cuts the pills and only takes 1/4 so she has a lot left.     Hx of obesity: s/p amrlena-en-y gastric bypass. She follows with bariatric surgery q6 months- has upcoming appointment in March. She gets her lab work checked there and they prescribe her naltrexone and phentermine and omeprazole. She still has some intermittently loose stools.    Hx of genital herpes- remote. Last outbreak was over 10 years ago. Has never been on viral suppression therapy. " "    Hx of hemorrhoids/anal fissure: resolved, she previously used hemorrhoid cream prn.    Hx of migraines: well controlled with tylenol prn. Cannot take ASA or NSAIDS due to history of gastric bypass.    Tobacco use: 1/2 ppd, precontemplative about quitting. Previously quit using chantix and has prescription at home for when she is ready.      ROS  Complete 10 point review of systems negative except as noted above in HPI    Reviewed Past Medical History, Medications, Family History and Social History in Epic and up to date with no new changes.    OBJECTIVE  /52 (Patient Site: Left Arm, Patient Position: Sitting, Cuff Size: Adult Regular)   Pulse 78   Temp 98.3  F (36.8  C) (Oral)   Resp 16   Ht 5' 5\" (1.651 m)   Wt 152 lb (68.9 kg)   LMP 02/07/2019   SpO2 97%   BMI 25.29 kg/m       General: Cooperative, pleasant, in no acute distress  HEENT: sclera clear, MMM  CV: RRR, normal S1/S2, no murmur, rubs, gallops  Resp: No respiratory distress. Clear to auscultation bilaterally. No wheezes, rales, rhonchi  Abd: Normal bowel sounds  MSK: Normal gait, strength grossly intact  Neuro: No focal deficits  Pysch: Normal mood and affect    ASSESSMENT/PLAN:   Yossi was seen today for establish care.    Diagnoses and all orders for this visit:    Establishing care with new doctor, encounter for: Reviewed all history, medications and updated.    Genital herpes simplex, unspecified site: No outbreak in over 15 years- no suppression required.    Anxiety  Recurrent major depressive disorder, in partial remission (H): On venlafaxine and buspar, stable. Continue psychotherapy at outside clinic. Concern for possible ADHD- has had evaluation at [facility]- was started on stimulant medication for a few months by previous PCP who recently retired. Discussed risks of medication vs benefits- discussed that I do not typically initiate stimulant therapy as first-line for attention issues in adults- encouraged therapy and re-referred " to [facility] for further evaluation. Of note, she was given short course of benzos by her previous PCP to help with acute stressors/panic symptoms but per notes, was not meant to be long-term medication- she uses sparingly and understands this.    Migraines: Stable, continue tylenol prn    Tobacco use: Precontemplative about quitting- has chantix at home that has worked previously when she is ready. Offered support    Hx of marlena-en-Y: Doing well. Has upcoming appointment with bariatric surgery- they will obtain labs.       RTC for annual physical.      Options for treatment and follow-up care were reviewed with the patient. Yossi Sky and/or guardian was engaged and actively involved in the decision making process. Yossi Sky and/or guardian verbalized understanding of the options discussed and was satisfied with the final plan.      Colette Anand MD

## 2021-06-27 ENCOUNTER — HEALTH MAINTENANCE LETTER (OUTPATIENT)
Age: 41
End: 2021-06-27

## 2021-07-05 ENCOUNTER — COMMUNICATION - HEALTHEAST (OUTPATIENT)
Dept: FAMILY MEDICINE | Facility: CLINIC | Age: 41
End: 2021-07-05

## 2021-07-05 DIAGNOSIS — F32.A DEPRESSION: ICD-10-CM

## 2021-07-05 RX ORDER — BUSPIRONE HYDROCHLORIDE 15 MG/1
TABLET ORAL
Qty: 180 TABLET | Refills: 3 | Status: SHIPPED | OUTPATIENT
Start: 2021-07-05 | End: 2024-06-17

## 2021-07-05 NOTE — TELEPHONE ENCOUNTER
Telephone Encounter by Cholo Baptiste RN at 7/5/2021 10:17 AM     Author: Cholo Baptiste RN Service: -- Author Type: Registered Nurse    Filed: 7/5/2021 10:18 AM Encounter Date: 7/5/2021 Status: Signed    : Cholo Baptiste, RN (Registered Nurse)       RN cannot approve Refill Request    RN can NOT refill this medication last refilled 6/2020 for 15 tablets.  Provider input requested. Last office visit: Visit date not found Last Physical: Visit date not found Last MTM visit: Visit date not found Last visit same specialty: 9/25/2020 Armand Morales MD.  Next visit within 3 mo: Visit date not found  Next physical within 3 mo: Visit date not found      Cholo Baptiste [facility] Connection Triage/Med Refill 7/5/2021    Requested Prescriptions   Pending Prescriptions Disp Refills   ? busPIRone (BUSPAR) 15 MG tablet [Pharmacy Med Name: BUSPIRONE 15MG TABLETS] 180 tablet 0     Sig: TAKE 1 TABLET BY MOUTH TWICE DAILY       Tricyclics/Misc Antidepressant/Antianxiety Meds Refill Protocol Passed - 7/5/2021  3:29 AM        Passed - PCP or prescribing provider visit in last year     Last office visit with prescriber/PCP: Visit date not found OR same dept: 9/25/2020 Armand Morales MD OR same specialty: 9/25/2020 Armand Morales MD  Last physical: Visit date not found Last MTM visit: Visit date not found   Next visit within 3 mo: Visit date not found  Next physical within 3 mo: Visit date not found  Prescriber OR PCP: Betina Fleming MD  Last diagnosis associated with med order: 1. Depression  - busPIRone (BUSPAR) 15 MG tablet [Pharmacy Med Name: BUSPIRONE 15MG TABLETS]; TAKE 1 TABLET BY MOUTH TWICE DAILY  Dispense: 180 tablet; Refill: 0    If protocol passes may refill for 12 months if within 3 months of last provider visit (or a total of 15 months).

## 2021-07-13 ENCOUNTER — RECORDS - HEALTHEAST (OUTPATIENT)
Dept: ADMINISTRATIVE | Facility: CLINIC | Age: 41
End: 2021-07-13

## 2021-08-02 DIAGNOSIS — F41.9 ANXIETY: Primary | ICD-10-CM

## 2021-08-02 RX ORDER — VENLAFAXINE HYDROCHLORIDE 150 MG/1
CAPSULE, EXTENDED RELEASE ORAL
Qty: 90 CAPSULE | Refills: 3 | Status: SHIPPED | OUTPATIENT
Start: 2021-08-02 | End: 2022-07-13

## 2021-10-17 ENCOUNTER — HEALTH MAINTENANCE LETTER (OUTPATIENT)
Age: 41
End: 2021-10-17

## 2021-12-02 DIAGNOSIS — Z98.84 HISTORY OF ROUX-EN-Y GASTRIC BYPASS: ICD-10-CM

## 2021-12-02 RX ORDER — OMEPRAZOLE 40 MG/1
CAPSULE, DELAYED RELEASE ORAL
Qty: 90 CAPSULE | Status: SHIPPED | OUTPATIENT
Start: 2021-12-02 | End: 2023-02-06

## 2021-12-03 ENCOUNTER — TELEPHONE (OUTPATIENT)
Dept: SURGERY | Facility: CLINIC | Age: 41
End: 2021-12-03

## 2021-12-03 NOTE — TELEPHONE ENCOUNTER
Prior Authorization Retail Medication Request    Medication/Dose: Omeprazole  ICD code (if different than what is on RX):    Previously Tried and Failed:    Rationale:      Insurance Name:  [facility]  Insurance ID:  [id number]      Pharmacy Information (if different than what is on RX)  Name:  [facility], [place]  Phone:  [phone]

## 2021-12-06 NOTE — TELEPHONE ENCOUNTER
Central Prior Authorization Team   Phone: [phone]      PA Initiation    Medication: Omeprazole-Initiated  Insurance Company: [employer] - Phone [phone] Fax [phone]  Pharmacy Filling the Rx: [facility] [employer] [id number] [place], [place] - [address] [place] [address] E AT [place] [address] & [facility]  Filling Pharmacy Phone: [phone]  Filling Pharmacy Fax:    Start Date: 12/6/2021

## 2021-12-06 NOTE — TELEPHONE ENCOUNTER
Prior Authorization Approval    Authorization Effective Date: 9/6/2021  Authorization Expiration Date: 12/6/2022  Medication: Omeprazole-APPROVED  Approved Dose/Quantity:   Reference #:     Insurance Company: [employer] - Phone [phone] Fax [phone]  Expected CoPay:       CoPay Card Available:      Foundation Assistance Needed:    Which Pharmacy is filling the prescription (Not needed for infusion/clinic administered): [id number] - [place] [address] [address] AT [place] [address] & [facility]  Pharmacy Notified: Yes  Patient Notified: No    Pharmacy will notify patient when medication is ready.

## 2022-07-24 ENCOUNTER — HEALTH MAINTENANCE LETTER (OUTPATIENT)
Age: 42
End: 2022-07-24

## 2022-08-23 ASSESSMENT — ENCOUNTER SYMPTOMS
HEMATOCHEZIA: 0
DYSURIA: 0
ABDOMINAL PAIN: 0
FREQUENCY: 0
DIARRHEA: 0
WEAKNESS: 0
SHORTNESS OF BREATH: 0
HEMATURIA: 0
NERVOUS/ANXIOUS: 1
HEARTBURN: 0
EYE PAIN: 0
CHILLS: 0
COUGH: 0
BREAST MASS: 0
HEADACHES: 0
SORE THROAT: 0
PALPITATIONS: 0
FEVER: 0
PARESTHESIAS: 0
ARTHRALGIAS: 0
NAUSEA: 0
MYALGIAS: 0
DIZZINESS: 0
JOINT SWELLING: 0
CONSTIPATION: 0

## 2022-08-23 ASSESSMENT — PATIENT HEALTH QUESTIONNAIRE - PHQ9
10. IF YOU CHECKED OFF ANY PROBLEMS, HOW DIFFICULT HAVE THESE PROBLEMS MADE IT FOR YOU TO DO YOUR WORK, TAKE CARE OF THINGS AT HOME, OR GET ALONG WITH OTHER PEOPLE: SOMEWHAT DIFFICULT
SUM OF ALL RESPONSES TO PHQ QUESTIONS 1-9: 14
SUM OF ALL RESPONSES TO PHQ QUESTIONS 1-9: 14

## 2022-08-24 ENCOUNTER — OFFICE VISIT (OUTPATIENT)
Dept: FAMILY MEDICINE | Facility: CLINIC | Age: 42
End: 2022-08-24
Payer: COMMERCIAL

## 2022-08-24 VITALS
RESPIRATION RATE: 20 BRPM | DIASTOLIC BLOOD PRESSURE: 56 MMHG | HEART RATE: 76 BPM | HEIGHT: 65 IN | SYSTOLIC BLOOD PRESSURE: 102 MMHG | WEIGHT: 160.75 LBS | TEMPERATURE: 98 F | OXYGEN SATURATION: 98 % | BODY MASS INDEX: 26.78 KG/M2

## 2022-08-24 DIAGNOSIS — Z00.00 ROUTINE GENERAL MEDICAL EXAMINATION AT A HEALTH CARE FACILITY: Primary | ICD-10-CM

## 2022-08-24 DIAGNOSIS — D64.9 ANEMIA, UNSPECIFIED TYPE: ICD-10-CM

## 2022-08-24 DIAGNOSIS — F32.1 CURRENT MODERATE EPISODE OF MAJOR DEPRESSIVE DISORDER WITHOUT PRIOR EPISODE (H): ICD-10-CM

## 2022-08-24 DIAGNOSIS — E78.2 MIXED HYPERLIPIDEMIA: ICD-10-CM

## 2022-08-24 DIAGNOSIS — L30.9 DERMATITIS: ICD-10-CM

## 2022-08-24 DIAGNOSIS — Z11.59 NEED FOR HEPATITIS C SCREENING TEST: ICD-10-CM

## 2022-08-24 LAB
ALBUMIN SERPL BCG-MCNC: 4.3 G/DL (ref 3.5–5.2)
ALP SERPL-CCNC: 110 U/L (ref 35–104)
ALT SERPL W P-5'-P-CCNC: <5 U/L (ref 10–35)
ANION GAP SERPL CALCULATED.3IONS-SCNC: 8 MMOL/L (ref 7–15)
AST SERPL W P-5'-P-CCNC: 26 U/L (ref 10–35)
BILIRUB SERPL-MCNC: 0.5 MG/DL
BUN SERPL-MCNC: 15.8 MG/DL (ref 6–20)
CALCIUM SERPL-MCNC: 9.1 MG/DL (ref 8.6–10)
CHLORIDE SERPL-SCNC: 105 MMOL/L (ref 98–107)
CHOLEST SERPL-MCNC: 204 MG/DL
CREAT SERPL-MCNC: 0.61 MG/DL (ref 0.51–0.95)
DEPRECATED HCO3 PLAS-SCNC: 26 MMOL/L (ref 22–29)
ERYTHROCYTE [DISTWIDTH] IN BLOOD BY AUTOMATED COUNT: 11.7 % (ref 10–15)
GFR SERPL CREATININE-BSD FRML MDRD: >90 ML/MIN/1.73M2
GLUCOSE SERPL-MCNC: 87 MG/DL (ref 70–99)
HCT VFR BLD AUTO: 39.3 % (ref 35–47)
HDLC SERPL-MCNC: 70 MG/DL
HGB BLD-MCNC: 13.1 G/DL (ref 11.7–15.7)
LDLC SERPL CALC-MCNC: 119 MG/DL
MCH RBC QN AUTO: 32.8 PG (ref 26.5–33)
MCHC RBC AUTO-ENTMCNC: 33.3 G/DL (ref 31.5–36.5)
MCV RBC AUTO: 98 FL (ref 78–100)
NONHDLC SERPL-MCNC: 134 MG/DL
PLATELET # BLD AUTO: 349 10E3/UL (ref 150–450)
POTASSIUM SERPL-SCNC: 4.1 MMOL/L (ref 3.4–5.3)
PROT SERPL-MCNC: 6.6 G/DL (ref 6.4–8.3)
RBC # BLD AUTO: 4 10E6/UL (ref 3.8–5.2)
SODIUM SERPL-SCNC: 139 MMOL/L (ref 136–145)
TRIGL SERPL-MCNC: 74 MG/DL
WBC # BLD AUTO: 6.4 10E3/UL (ref 4–11)

## 2022-08-24 PROCEDURE — 86803 HEPATITIS C AB TEST: CPT | Performed by: FAMILY MEDICINE

## 2022-08-24 PROCEDURE — 36415 COLL VENOUS BLD VENIPUNCTURE: CPT | Performed by: FAMILY MEDICINE

## 2022-08-24 PROCEDURE — 85027 COMPLETE CBC AUTOMATED: CPT | Performed by: FAMILY MEDICINE

## 2022-08-24 PROCEDURE — 80053 COMPREHEN METABOLIC PANEL: CPT | Performed by: FAMILY MEDICINE

## 2022-08-24 PROCEDURE — 99396 PREV VISIT EST AGE 40-64: CPT | Performed by: FAMILY MEDICINE

## 2022-08-24 PROCEDURE — 80061 LIPID PANEL: CPT | Performed by: FAMILY MEDICINE

## 2022-08-24 RX ORDER — VENLAFAXINE HYDROCHLORIDE 225 MG/1
225 TABLET, EXTENDED RELEASE ORAL DAILY
Qty: 90 TABLET | Refills: 3 | Status: SHIPPED | OUTPATIENT
Start: 2022-08-24 | End: 2024-06-17

## 2022-08-24 RX ORDER — HYDROCORTISONE 2.5 %
CREAM (GRAM) TOPICAL
Qty: 30 G | Refills: 11 | Status: SHIPPED | OUTPATIENT
Start: 2022-08-24 | End: 2023-11-02

## 2022-08-24 ASSESSMENT — ENCOUNTER SYMPTOMS
DIARRHEA: 0
DYSURIA: 0
FEVER: 0
SHORTNESS OF BREATH: 0
HEMATOCHEZIA: 0
HEMATURIA: 0
ARTHRALGIAS: 0
MYALGIAS: 0
CHILLS: 0
FREQUENCY: 0
ABDOMINAL PAIN: 0
WEAKNESS: 0
NERVOUS/ANXIOUS: 1
BREAST MASS: 0
HEADACHES: 0
EYE PAIN: 0
PALPITATIONS: 0
COUGH: 0
HEARTBURN: 0
PARESTHESIAS: 0
NAUSEA: 0
DIZZINESS: 0
SORE THROAT: 0
JOINT SWELLING: 0
CONSTIPATION: 0

## 2022-08-24 NOTE — PROGRESS NOTES
SUBJECTIVE:   CC: Yossi Sky is an 42 year old woman who presents for preventive health visit.       Patient has been advised of split billing requirements and indicates understanding: Yes  Healthy Habits:     Getting at least 3 servings of Calcium per day:  Yes    Bi-annual eye exam:  NO    Dental care twice a year:  NO    Sleep apnea or symptoms of sleep apnea:  Daytime drowsiness    Diet:  Regular (no restrictions)    Frequency of exercise:  None    Taking medications regularly:  Yes    Medication side effects:  None    PHQ-2 Total Score: 2    Additional concerns today:  Yes      Afternoon dose of buspirone causes headache.  She will try 1/2 dose.    Fasting.        Today's PHQ-2 Score:   PHQ-2 ( 1999 Pfizer) 8/23/2022   Q1: Little interest or pleasure in doing things 1   Q2: Feeling down, depressed or hopeless 1   PHQ-2 Score 2   Q1: Little interest or pleasure in doing things Several days   Q2: Feeling down, depressed or hopeless Several days   PHQ-2 Score 2       Abuse: Current or Past (Physical, Sexual or Emotional) - No  Do you feel safe in your environment? Yes    Have you ever done Advance Care Planning? (For example, a Health Directive, POLST, or a discussion with a medical provider or your loved ones about your wishes): No, advance care planning information given to patient to review.  Patient declined advance care planning discussion at this time.    Social History     Tobacco Use     Smoking status: Current Every Day Smoker     Packs/day: 0.50     Start date: 3/18/2015     Smokeless tobacco: Never Used     Tobacco comment: 1/2 pag daily   Substance Use Topics     Alcohol use: Yes     Comment: Alcoholic Drinks/day: once a month         Alcohol Use 8/23/2022   Prescreen: >3 drinks/day or >7 drinks/week? No       Reviewed orders with patient.  Reviewed health maintenance and updated orders accordingly - Yes      Breast Cancer Screening:    Breast CA Risk Assessment (FHS-7) 8/23/2022   Do you have a  family history of breast, colon, or ovarian cancer? No / Unknown         Mammogram Screening - Mammography discussed, not appropriate due to age  Pertinent mammograms are reviewed under the imaging tab.    History of abnormal Pap smear: NO - age 30-65 PAP every 5 years with negative HPV co-testing recommended  PAP / HPV Latest Ref Rng & Units 4/16/2019 2/16/2016   PAP Negative for squamous intraepithelial lesion or malignancy. Negative for squamous intraepithelial lesion or malignancy  Electronically signed by Johana Padgett CT (ASCP) on 4/23/2019 at  1:30 PM   Negative for squamous intraepithelial lesion or malignancy  Electronically signed by Delia Jackson CT (ASCP) on 2/23/2016 at  2:41 PM     HPV16 NEG Negative -   HPV18 NEG Negative -   HRHPV NEG Negative -     Reviewed and updated as needed this visit by clinical staff   Tobacco  Allergies  Meds                Reviewed and updated as needed this visit by Provider                       Review of Systems   Constitutional: Negative for chills and fever.   HENT: Negative for congestion, ear pain, hearing loss and sore throat.    Eyes: Negative for pain and visual disturbance.   Respiratory: Negative for cough and shortness of breath.    Cardiovascular: Negative for chest pain, palpitations and peripheral edema.   Gastrointestinal: Negative for abdominal pain, constipation, diarrhea, heartburn, hematochezia and nausea.   Breasts:  Negative for tenderness, breast mass and discharge.   Genitourinary: Negative for dysuria, frequency, genital sores, hematuria, pelvic pain, urgency, vaginal bleeding and vaginal discharge.   Musculoskeletal: Negative for arthralgias, joint swelling and myalgias.   Skin: Negative for rash.   Neurological: Negative for dizziness, weakness, headaches and paresthesias.   Psychiatric/Behavioral: Negative for mood changes. The patient is nervous/anxious.           OBJECTIVE:   /56 (BP Location: Left arm)   Pulse 76   Temp 98  " F (36.7  C) (Oral)   Resp 20   Ht 1.638 m (5' 4.5\")   Wt 72.9 kg (160 lb 12 oz)   LMP 08/24/2022   SpO2 98%   BMI 27.17 kg/m    Physical Exam  Eyes: EOM full, pupils normal, conjunctivae normal  Ears: TM's and canals normal  Oropharynx: normal  Neck: supple without adenopathy or thyromegaly  Lungs: normal  Heart: regular rhythm, normal rate, no murmur  Abdomen: no HSM, mass or tenderness  Pt declined breast exam  Extremities: FROM, normal strength and sensation  Right foot: callus 5th MT head        ASSESSMENT/PLAN:   Yossi was seen today for physical.    Diagnoses and all orders for this visit:    Routine general medical examination at a health care facility    Current moderate episode of major depressive disorder without prior episode (H)  -     venlafaxine (EFFEXOR-ER) 225 MG 24 hr tablet; Take 1 tablet (225 mg) by mouth daily    Need for hepatitis C screening test  -     Hepatitis C Screen Reflex to HCV RNA Quant and Genotype; Future  -     Hepatitis C Screen Reflex to HCV RNA Quant and Genotype    Mixed hyperlipidemia  -     Comprehensive metabolic panel (BMP + Alb, Alk Phos, ALT, AST, Total. Bili, TP); Future  -     Lipid panel reflex to direct LDL Fasting; Future  -     Comprehensive metabolic panel (BMP + Alb, Alk Phos, ALT, AST, Total. Bili, TP)  -     Lipid panel reflex to direct LDL Fasting    Anemia, unspecified type  -     CBC with platelets; Future  -     CBC with platelets    Dermatitis  -     hydrocortisone 2.5 % cream; Apply 1/2 gram to face 2 times daily    Other orders  -     REVIEW OF HEALTH MAINTENANCE PROTOCOL ORDERS        Patient has been advised of split billing requirements and indicates understanding: Yes    COUNSELING:  Reviewed preventive health counseling, as reflected in patient instructions       Regular exercise       Healthy diet/nutrition    Estimated body mass index is 27.17 kg/m  as calculated from the following:    Height as of this encounter: 1.638 m (5' 4.5\").    Weight " as of this encounter: 72.9 kg (160 lb 12 oz).        She reports that she has been smoking. She started smoking about 7 years ago. She has been smoking about 0.50 packs per day. She has never used smokeless tobacco.        Counseling Resources:  ATP IV Guidelines  Pooled Cohorts Equation Calculator  Breast Cancer Risk Calculator  BRCA-Related Cancer Risk Assessment: FHS-7 Tool  FRAX Risk Assessment  ICSI Preventive Guidelines  Dietary Guidelines for Americans, 2010  [employer] MyPlate  ASA Prophylaxis  Lung CA Screening    Armand Morales MD, MD  [facility]  Answers for HPI/ROS submitted by the patient on 8/23/2022  If you checked off any problems, how difficult have these problems made it for you to do your work, take care of things at home, or get along with other people?: Somewhat difficult  PHQ9 TOTAL SCORE: 14

## 2022-08-25 ENCOUNTER — TELEPHONE (OUTPATIENT)
Dept: FAMILY MEDICINE | Facility: CLINIC | Age: 42
End: 2022-08-25

## 2022-08-25 LAB — HCV AB SERPL QL IA: NONREACTIVE

## 2022-08-25 NOTE — TELEPHONE ENCOUNTER
Central Prior Authorization Team   Phone: [phone]    PA Initiation    Medication: Venlafaxine HCl ER 225MG er tablets  Insurance Company: Kyra ([facility]) - Phone [phone] Fax [phone]  Pharmacy Filling the Rx: [id number] - [place], [place] - [address]  Filling Pharmacy Phone: [phone]  Filling Pharmacy Fax:    Start Date: 8/25/2022

## 2022-08-25 NOTE — TELEPHONE ENCOUNTER
Prior Authorization Request  Who's requesting:  PHARMACY   Pharmacy Name and Location: [facility] [id number] [place] [address] [address]  Medication Name: venlafaxine (EFFEXOR-ER) 225 MG 24 hr tablet  Insurance Plan: [facility]   Insurance Member ID Number: [id number]   CoverMyMeds Key: [id number]  Informed patient that prior authorizations can take up to 10 business days for response:   Yes  Okay to leave a detailed message: No

## 2022-08-26 NOTE — TELEPHONE ENCOUNTER
Prior Authorization Approval    Authorization Effective Date: 8/25/2022  Authorization Expiration Date: 8/25/2023  Medication: Venlafaxine HCl ER 225MG er tablets  Approved Dose/Quantity:    Reference #:     Insurance Company: Kyra ([facility]) - Phone [phone] Fax [phone]  Expected CoPay:       CoPay Card Available:      Foundation Assistance Needed:    Which Pharmacy is filling the prescription (Not needed for infusion/clinic administered): [id number] - [place], [place] - [address] [place] [address]  Pharmacy Notified: Yes  Patient Notified: Yes

## 2022-10-02 ENCOUNTER — HEALTH MAINTENANCE LETTER (OUTPATIENT)
Age: 42
End: 2022-10-02

## 2023-02-01 DIAGNOSIS — Z76.0 ENCOUNTER FOR MEDICATION REFILL: Primary | ICD-10-CM

## 2023-02-01 DIAGNOSIS — F32.1 CURRENT MODERATE EPISODE OF MAJOR DEPRESSIVE DISORDER WITHOUT PRIOR EPISODE (H): ICD-10-CM

## 2023-02-01 RX ORDER — VENLAFAXINE HYDROCHLORIDE 150 MG/1
CAPSULE, EXTENDED RELEASE ORAL
Qty: 90 CAPSULE | Refills: 3 | Status: SHIPPED | OUTPATIENT
Start: 2023-02-01 | End: 2024-01-29

## 2023-02-04 DIAGNOSIS — Z98.84 HISTORY OF ROUX-EN-Y GASTRIC BYPASS: ICD-10-CM

## 2023-02-06 RX ORDER — OMEPRAZOLE 40 MG/1
CAPSULE, DELAYED RELEASE ORAL
Qty: 90 CAPSULE | Status: SHIPPED | OUTPATIENT
Start: 2023-02-06 | End: 2024-05-08

## 2023-04-12 DIAGNOSIS — Z76.0 ENCOUNTER FOR MEDICATION REFILL: ICD-10-CM

## 2023-04-12 DIAGNOSIS — F32.A DEPRESSION: ICD-10-CM

## 2023-04-12 RX ORDER — BUSPIRONE HYDROCHLORIDE 15 MG/1
15 TABLET ORAL 2 TIMES DAILY
Qty: 180 TABLET | Refills: 3 | Status: SHIPPED | OUTPATIENT
Start: 2023-04-12 | End: 2024-05-08

## 2023-04-12 NOTE — TELEPHONE ENCOUNTER
Dr. Anand  Patient is requesting refill of the Buspar medication. A one month medication and pharmacy pended for your review.   Patient was last seen for annual physical on 8/24/2023 by another provider.  Last appointment with another provider was on 7/24/2020.  Please advise if patient needs to be seen for future refill.    Thank you    Eduardo

## 2023-04-12 NOTE — TELEPHONE ENCOUNTER
Reason for Call:  Medication or medication refill:    Do you use a [facility]?  Name of the pharmacy and phone number for the current request:  [facility]    Name of the medication requested: Buspirone    Other request: Needs refill    Can we leave a detailed message on this number? YES    Phone number patient can be reached at: Cell number on file:    Telephone Information:   Mobile [phone]       Best Time: any    Call taken on 4/12/2023 at 10:34 AM by Anne Keating

## 2023-10-21 ENCOUNTER — HEALTH MAINTENANCE LETTER (OUTPATIENT)
Age: 43
End: 2023-10-21

## 2023-11-01 ASSESSMENT — PATIENT HEALTH QUESTIONNAIRE - PHQ9
10. IF YOU CHECKED OFF ANY PROBLEMS, HOW DIFFICULT HAVE THESE PROBLEMS MADE IT FOR YOU TO DO YOUR WORK, TAKE CARE OF THINGS AT HOME, OR GET ALONG WITH OTHER PEOPLE: SOMEWHAT DIFFICULT
SUM OF ALL RESPONSES TO PHQ QUESTIONS 1-9: 10
SUM OF ALL RESPONSES TO PHQ QUESTIONS 1-9: 10

## 2023-11-01 NOTE — COMMUNITY RESOURCES LIST (ENGLISH)
11/01/2023   [facility]  N/A  For questions about this resource list or additional care needs, please contact your primary care clinic or care manager.  Phone: [phone]   Email: N/A   Address: [address] [place] [address]   Hours: N/A        Financial Stability       Utility payment assistance  1  [facility] Distance: 5.61 miles      Phone/Virtual   [address] [place] [address]  Language: English  Hours: Mon - Fri 9:00 AM - 4:00 PM , Sun 9:30 AM - 12:00 PM  Fees: Free   Phone: (341) 767-9148 Email: [email or web address] Website: [email or web address]     2  [employer] (HAP) [facility] - Supportive Housing Assistance Program (SHAP) - Utility payment assistance Distance: 9.49 miles      Phone/Virtual   [address] [place] [address]  Language: English, Hmong, Ezra Weldon  Hours: Mon - Fri 8:30 AM - 5:00 PM  Fees: Free   Phone: (415) 318-8848 Email: [email or web address] Website: http://www.hmong.org/hap-impact-areas/          Important Numbers & Websites       Emergency Services   [address]  [facility]   311  Poison Control   (593) 857-7101  Suicide Prevention Lifeline   (523) 553-2951 (TALK)  Child Abuse Hotline   (515) 933-2504 (4-A-Child)  Sexual Assault Hotline   (937) 186-6089 (HOPE)  National Runaway Safeline   (295) 855-2556 (RUNAWAY)  All-Options Talkline   (683) 967-2335  Substance Abuse Referral   (627) 652-6871 (HELP)

## 2023-11-02 ENCOUNTER — ANCILLARY PROCEDURE (OUTPATIENT)
Dept: GENERAL RADIOLOGY | Facility: CLINIC | Age: 43
End: 2023-11-02
Attending: FAMILY MEDICINE
Payer: COMMERCIAL

## 2023-11-02 ENCOUNTER — OFFICE VISIT (OUTPATIENT)
Dept: FAMILY MEDICINE | Facility: CLINIC | Age: 43
End: 2023-11-02
Payer: COMMERCIAL

## 2023-11-02 VITALS
BODY MASS INDEX: 28 KG/M2 | OXYGEN SATURATION: 97 % | DIASTOLIC BLOOD PRESSURE: 67 MMHG | HEIGHT: 64 IN | SYSTOLIC BLOOD PRESSURE: 97 MMHG | RESPIRATION RATE: 14 BRPM | TEMPERATURE: 98.4 F | WEIGHT: 164 LBS | HEART RATE: 77 BPM

## 2023-11-02 DIAGNOSIS — M25.552 HIP PAIN, LEFT: ICD-10-CM

## 2023-11-02 DIAGNOSIS — Z23 ENCOUNTER FOR IMMUNIZATION: ICD-10-CM

## 2023-11-02 DIAGNOSIS — M25.552 HIP PAIN, LEFT: Primary | ICD-10-CM

## 2023-11-02 PROCEDURE — 90715 TDAP VACCINE 7 YRS/> IM: CPT | Performed by: FAMILY MEDICINE

## 2023-11-02 PROCEDURE — 90677 PCV20 VACCINE IM: CPT | Performed by: FAMILY MEDICINE

## 2023-11-02 PROCEDURE — 73502 X-RAY EXAM HIP UNI 2-3 VIEWS: CPT | Mod: TC | Performed by: RADIOLOGY

## 2023-11-02 PROCEDURE — 90472 IMMUNIZATION ADMIN EACH ADD: CPT | Performed by: FAMILY MEDICINE

## 2023-11-02 PROCEDURE — 90471 IMMUNIZATION ADMIN: CPT | Performed by: FAMILY MEDICINE

## 2023-11-02 PROCEDURE — 99213 OFFICE O/P EST LOW 20 MIN: CPT | Mod: 25 | Performed by: FAMILY MEDICINE

## 2023-11-02 PROCEDURE — 72170 X-RAY EXAM OF PELVIS: CPT | Mod: TC | Performed by: RADIOLOGY

## 2023-11-02 NOTE — PROGRESS NOTES
"  Assessment & Plan     Hip pain, left  Left hip pain x1 year with pain exam notable for positive FADIR and FELI. Xray done today and reviewed by me showing some joint space narrowing. Differential includes femoroacetabular impingement versus osteoarthritis. Will send referral to PT, await final xray read and then determine if referral to orthopedics also necessary.  - XR Hip Left 2-3 Views  - Physical Therapy Referral  - XR Pelvis 1/2 Views    Encounter for immunization  Counseled on risks and benefits of vaccination and my recommendation for vaccination; patient's informed decision was to accept PCV20 and Tdap decline COVID and flu vaccine today. Informed patient of how to obtain in future if they desire.  - Pneumococcal 20 Valent Conjugate (Prevnar 20)  - TDAP 10-64Y (ADACEL,BOOSTRIX)               Nicotine/Tobacco Cessation:  She reports that she has been smoking cigarettes. She started smoking about 8 years ago. She has been smoking an average of .5 packs per day. She has never used smokeless tobacco.  Nicotine/Tobacco Cessation Plan:   Information offered: Patient not interested at this time      BMI:   Estimated body mass index is 27.79 kg/m  as calculated from the following:    Height as of this encounter: 1.636 m (5' 4.41\").    Weight as of this encounter: 74.4 kg (164 lb).   Weight management plan: Patient was referred to their PCP to discuss a diet and exercise plan.    FUTURE APPOINTMENTS:       - Follow-up for annual visit or as needed    Nupur Galindo MD  [facility] Sy is a 43 year old, presenting for the following health issues:  hip pain  (Left hip)        11/2/2023     8:10 AM   Additional Questions   Roomed by yvette burr       History of Present Illness       Reason for visit:  Lt hip pain  Symptom onset:  More than a month  Symptom intensity:  Moderate  Symptom progression:  Worsening  Had these symptoms before:  Yes  Has tried/received " "treatment for these symptoms:  No  What makes it worse:  A lot of walking or being on my feet for long periods of time, dehydration  What makes it better:  Rest, Tylenol, Ibuprofen    She eats 2-3 servings of fruits and vegetables daily.She consumes 2 sweetened beverage(s) daily.She exercises with enough effort to increase her heart rate 9 or less minutes per day.  She exercises with enough effort to increase her heart rate 3 or less days per week.   She is taking medications regularly.    Left hip pain  - over a year ago  - infrequent initially  - if walking a lot, or if \"strolling\" (slower pace) it is worse  - cannot lay on left side  - feels like it's in hip joint  - sometimes shoots down the leg, side, sometimes all the way down to the foot  - no numbness or tingling, giving out  - 2 years ago had a fall on the snow/ice but doesn't remember what side she fell on, not sure if this precipitated pain  - has tried tylenol for pain mostly (doesn't upset stomach as much); will take ibuprofen with tylenol if really bad, often right before bed (has gastric bypass 8-9y ago) - helps for a little while  - no early family history of hip problems   - no back pain        Review of Systems         Objective    BP 97/67   Pulse 77   Temp 98.4  F (36.9  C) (Oral)   Resp 14   Ht 1.636 m (5' 4.41\")   Wt 74.4 kg (164 lb)   LMP 10/30/2023 (Approximate)   SpO2 97%   BMI 27.79 kg/m    Body mass index is 27.79 kg/m .  Physical Exam   General: well-appearing, no acute distress  HENT: normocephalic, atraumatic  Eyes: no conjunctival injection, no scleral icterus, EOMI  Neck: normal ROM, supple  Cardiovascular: regular rate and rhythm, no m/r/g  Pulmonary: normal effort, no wheezes/rales/rhonchi, CTAB  Abdomen: non-distended  Musculoskeletal: normal gait; Left hip: no skin changes, no gross deformity, full ROM, no tenderness over greater trochanter or anterior hip/groin, + FADIR and + FELI and pain with log roll  Neg straight leg " raise bilaterally, no tenderness to palpation of lower back  Extremities: no lower extremity edema  Psych: mood/affect normal      Xray - Reviewed and interpreted by me. Narrowing of joint space noted.          Prior to immunization administration, verified patients identity using patient s name and date of birth. Please see Immunization Activity for additional information.     Screening Questionnaire for Adult Immunization    Are you sick today?   No   Do you have allergies to medications, food, a vaccine component or latex?   No   Have you ever had a serious reaction after receiving a vaccination?   No   Do you have a long-term health problem with heart, lung, kidney, or metabolic disease (e.g., diabetes), asthma, a blood disorder, no spleen, complement component deficiency, a cochlear implant, or a spinal fluid leak?  Are you on long-term aspirin therapy?   No   Do you have cancer, leukemia, HIV/AIDS, or any other immune system problem?   No   Do you have a parent, brother, or sister with an immune system problem?   No   In the past 3 months, have you taken medications that affect  your immune system, such as prednisone, other steroids, or anticancer drugs; drugs for the treatment of rheumatoid arthritis, Crohn s disease, or psoriasis; or have you had radiation treatments?   No   Have you had a seizure, or a brain or other nervous system problem?   No   During the past year, have you received a transfusion of blood or blood    products, or been given immune (gamma) globulin or antiviral drug?   No   For women: Are you pregnant or is there a chance you could become       pregnant during the next month?   No   Have you received any vaccinations in the past 4 weeks?   No     Immunization questionnaire answers were all negative.      Patient instructed to remain in clinic for 15 minutes afterwards, and to report any adverse reactions.     Screening performed by Veronique Ortiz MA on 11/2/2023 at 9:06 AM.

## 2023-11-02 NOTE — COMMUNITY RESOURCES LIST (ENGLISH)
11/02/2023   [facility] - Outpatient Clinics  N/A  For additional resource needs, please contact your health insurance member services or your primary care team.  Phone: [phone]   Email: N/A   Address: [facility] [place] [address]   Hours: N/A        Financial Stability       Utility payment assistance  1  [place] [employer] - [place]'s Telephone Assistance Plan (TAP) and Federal Lifeline and Affordable Connectivity Program (ACP) Distance: 10.31 miles      Phone/Virtual   [address] [place] [address]  Language: English  Fees: Free   Phone: (425) 417-1736 Email: consumer.puc@[facility].mn. Website: https://[place].gov/puc/consumers/telephone/     2  [place] [employer] - Energy and Utilities Distance: 11.35 miles      In-Person, Phone/Virtual   [address] [place] [address]  Language: English  Hours: Mon - Fri 8:30 AM - 4:30 PM  Fees: Free   Phone: (812) 339-2554 Website: https://[place]/[employer]/energy/consumer-assistance/energy-assistance-program/          Important Numbers & Websites       [facility]   211 211itedway.org  Poison Control   (751) 375-1843 [place]poison[place]  Suicide and Crisis Lifeline   988 [facility].org  Childhelp [place] Child Abuse Hotline   598.235.1213 Childhelphotline.org  National Sexual Assault Hotline   (234) 472-3067 (HOPE) Rainn.org  National Runaway Safeline   (440) 811-3249 (RUNAWAY) 1800runaway.org  Pregnancy & Postpartum Support [place]   Call/text 514-064-6645 Ppsupportmn.org  Substance Abuse National Helpline ([facility]   082-760-HELP (6985) Findtreatment.gov  Emergency Services   911

## 2023-11-03 DIAGNOSIS — M25.552 HIP PAIN, LEFT: Primary | ICD-10-CM

## 2023-11-09 ENCOUNTER — THERAPY VISIT (OUTPATIENT)
Dept: PHYSICAL THERAPY | Facility: CLINIC | Age: 43
End: 2023-11-09
Attending: FAMILY MEDICINE
Payer: COMMERCIAL

## 2023-11-09 DIAGNOSIS — M25.552 HIP PAIN, LEFT: ICD-10-CM

## 2023-11-09 PROCEDURE — 97161 PT EVAL LOW COMPLEX 20 MIN: CPT | Mod: GP | Performed by: PHYSICAL THERAPIST

## 2023-11-09 PROCEDURE — 97110 THERAPEUTIC EXERCISES: CPT | Mod: GP | Performed by: PHYSICAL THERAPIST

## 2023-11-09 ASSESSMENT — ACTIVITIES OF DAILY LIVING (ADL)
WALKING_DOWN_STEEP_HILLS: SLIGHT DIFFICULTY
PUTTING ON SOCKS AND SHOES: SLIGHT DIFFICULTY
HOS_ADL_ITEM_SCORE_TOTAL: 50
GOING UP 1 FLIGHT OF STAIRS: SLIGHT DIFFICULTY
ROLLING OVER IN BED: SLIGHT DIFFICULTY
DEEP SQUATTING: SLIGHT DIFFICULTY
LIGHT_TO_MODERATE_WORK: SLIGHT DIFFICULTY
GETTING INTO AND OUT OF AN AVERAGE CAR: SLIGHT DIFFICULTY
STANDING FOR 15 MINUTES: SLIGHT DIFFICULTY
STEPPING UP AND DOWN CURBS: SLIGHT DIFFICULTY
TWISTING/PIVOTING ON INVOLVED LEG: SLIGHT DIFFICULTY
HOS_ADL_SCORE(%): 73.53
WALKING_15_MINUTES_OR_GREATER: MODERATE DIFFICULTY
SITTING FOR 15 MINUTES: SLIGHT DIFFICULTY
HOS_ADL_HIGHEST_POTENTIAL_SCORE: 68
GOING DOWN 1 FLIGHT OF STAIRS: SLIGHT DIFFICULTY
RECREATIONAL ACTIVITIES: SLIGHT DIFFICULTY
WALKING_APPROXIMATELY_10_MINUTES: SLIGHT DIFFICULTY
WALKING_UP_STEEP_HILLS: SLIGHT DIFFICULTY
GETTING_INTO_AND_OUT_OF_A_BATHTUB: SLIGHT DIFFICULTY
HEAVY_WORK: SLIGHT DIFFICULTY
WALKING_INITIALLY: SLIGHT DIFFICULTY

## 2023-11-09 NOTE — PROGRESS NOTES
ASSESSMENT & PLAN    Yossi was seen today for pain.    Diagnoses and all orders for this visit:    Greater trochanteric pain syndrome of left lower extremity  -     Orthopedic  Referral      This issue is chronic and Worsening. Yossi presents to clinic today with chronic left hip pain.  Radiographs were reviewed with the patient today and show no signs of degenerative disease.  On exam, she has tenderness to palpation over the greater trochanter and pain with resisted abduction, consistent with greater trochanteric pain syndrome.  In addition, she has marked weakness of abduction and extension, which is likely contributing to her symptoms.  We discussed the above findings and the importance of proper biomechanics and muscle strengthening in treatment of this problem.  We determined the following plan:  -Patient is already scheduled with physical therapy, she will continue to work with them  -She can continue to use over-the-counter pain medications, ice, heat as needed  -She will follow-up in our clinic if no improvement in 4 to 6 weeks.  At that time could consider CSI to greater trochanter bursa versus MRI and further procedural planning.    Ventura Connor [facility] SP[facility]ICINE CLINIC [facility]    -----  Chief Complaint   Patient presents with    Left Hip - Pain       SUBJECTIVE  Yossi Sky is a/an 43 year old female who is seen in consultation at the request of  Nupur Mitchell M.D. for evaluation of left hip pain.     The patient is seen by themselves.    Onset: > 1 years(s) ago. Reports insidious onset without acute precipitating event.  Location of Pain: left hip, posterolateral margin, it feels deep.  Worsened by: walking, laying on the left side.   Better with: heat pack, tylenol, rest.   Treatments tried: rest/activity avoidance, heat, and Tylenol, PT (just started, 1 visit)  Associated symptoms: deep aching pain with throbbing at times. It has radiated down  past the knee before.     Orthopedic/Surgical history: NO, also denies H/o low back injuries.   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       REVIEW OF SYSTEMS:  Review of systems negative unless mentioned in HPI     OBJECTIVE:  /68   LMP 10/30/2023 (Approximate)    General: healthy, alert and in no distress  Skin: no suspicious lesions or rash.  CV: distal perfusion intact   Resp: normal respiratory effort without conversational dyspnea   Psych: normal mood and affect  Gait: NORMAL  Neuro: Normal light sensory exam of LL extremity     Hip Exam:    Musculoskeletal Exam   Gait Normal     Left Right   Inspection Grossly Normal  Grossly Normal    Palpation     Tenderness over  Greater trochanter None   Range of Motion     Flexion - Supine  Full to about 90  Full to about 90   ER at 90 of flexion 55 55   IR at 90 of flexion 40 40   Strength 5/5 Flexion  4/5 Abduction in Neutral  3/5 Abduction in Extension    Grossly Nml otherwise  5/5 Flexion  5/5 Abduction in Neutral  5/5 Abduction in Extension    Grossly Nml otherwise    Pain Provocation Tests     FADIR NEG NEG   FELI NEG  NEG    Instability/log roll NEG  NEG    Trochanteric Pain Sign POS NEG    Straight leg raise (passive) NEG  NEG    Posterior/Ischiofemoral Impingement Provocation NEG  NEG    Neurologic Intact sensation          RADIOLOGY:  Final results and radiologist's interpretation, available in the [facility] health record.  Images were reviewed with the patient in the office today.  My personal interpretation of the performed imaging: Well maintained joint spaces of the left hip. No bony abnormality.

## 2023-11-09 NOTE — PROGRESS NOTES
"PHYSICAL THERAPY EVALUATION  Type of Visit: Evaluation    See electronic medical record for Abuse and Falls Screening details.    Subjective       Presenting condition or subjective complaint: Lt hip pain. Reports 1+ year of symptoms, but was intermittent. Reports 2 weeks ago was in [place] and noted that hip pain was limiting. Can only lay on left side for 5 minutes, prolonged periods of walking will be bothersome, especially at a slower pace such as shopping. Pain is always present to a degree. Denies numbness/tingling.     Date of onset: 11/02/23 (MD order. Symptoms 1+ years)    Relevant medical history: Concussions; Depression; Dizziness; Migraines or headaches; Overweight; Severe headaches; Smoking   Dates & types of surgery: Gastric bypass 2014, cholecystectomy 3013    Prior diagnostic imaging/testing results: X-ray     Prior therapy history for the same diagnosis, illness or injury: No      Prior Level of Function  Transfers:   Ambulation:   ADL:   IADL:     Living Environment  Social support: With a significant other or spouse   Type of home: [facility]   Stairs to enter the home: Yes 9 Is there a railing: Yes   Ramp: No   Stairs inside the home: Yes 20 Is there a railing: Yes   Help at home: None  Equipment owned:       Employment: Yes Billing and   Hobbies/Interests:      Patient goals for therapy: Minimize pain    Pain assessment: See objective evaluation for additional pain details     Objective   HIP EVALUATION  PAIN: Pain Level at Rest: 4/10  Pain Level with Use: 10/10  Pain Location: hip Left (\"feels deep\")  INTEGUMENTARY (edema, incisions):   POSTURE:   GAIT:   Weightbearing Status:   Assistive Device(s):   Gait Deviations:   BALANCE/PROPRIOCEPTION:   WEIGHTBEARING ALIGNMENT:   NON-WEIGHTBEARING ALIGNMENT:    ROM:   ROM LUmbar: Flexion, extension WNL. Mild tightness noted w/ R SB  Hip ROM: mild impingement end range flexion. ER/IR WNL    PELVIC/SI SCREEN:   STRENGTH:   Pain: - " none + mild ++ moderate +++ severe  Strength Scale: 0-5/5 Left Right   Hip Flexion 4+, + (mild) 5   Hip Extension 4- 5-   Hip Abduction 4 4+   Hip Adduction     Hip Internal Rotation 4, + (mild) 5-   Hip External Rotation 4, - (none) 5-   Knee Flexion 5 5   Knee Extension 5 5     LE FLEXIBILITY:  decreased hip flexor. Hamstrings B  SPECIAL TESTS:    Left Right   FELI Positive    FADIR/Labrum/BEBO Positive    Femoral Nerve     Radhika's Negative     Piriformis     Quadrant Testing Negative     SLR Negative     Slump Negative     Stork with Extension     Eleuterio            FUNCTIONAL TESTS: SLS: wfl  PALPATION:  TTP posterior hip, piriformis  JOINT MOBILITY: minimal restriction    Assessment & Plan   CLINICAL IMPRESSIONS  Medical Diagnosis: Hip pain, left (M25.552)    Treatment Diagnosis: Left hip pain   Impression/Assessment: Patient is a 43 year old female with left hip pain complaints.  The following significant findings have been identified: Pain, Decreased ROM/flexibility, Decreased joint mobility, Decreased strength, Impaired muscle performance, and Decreased activity tolerance. These impairments interfere with their ability to perform self care tasks, work tasks, recreational activities, household chores, household mobility, and community mobility as compared to previous level of function.     Clinical Decision Making (Complexity):  Clinical Presentation: Stable/Uncomplicated  Clinical Presentation Rationale: based on medical and personal factors listed in PT evaluation  Clinical Decision Making (Complexity): Low complexity    PLAN OF CARE  Treatment Interventions:  Modalities: Cryotherapy, E-stim, Hot Pack, Ultrasound  Interventions: Gait Training, Manual Therapy, Neuromuscular Re-education, Therapeutic Activity, Therapeutic Exercise, Self-Care/Home Management    Long Term Goals            Frequency of Treatment: 1x/week  Duration of Treatment: 8 weeks    Recommended Referrals to Other Professionals:   Education  Assessment:        Risks and benefits of evaluation/treatment have been explained.   Patient/Family/caregiver agrees with Plan of Care.     Evaluation Time:     PT Eval, Low Complexity Minutes (26926): 20       Signing Clinician: LÓPEZ Ashley [facility]                                                                                   OUTPATIENT PHYSICAL THERAPY      PLAN OF TREATMENT FOR OUTPATIENT REHABILITATION   Patient's Last Name, First Name, Yossi Light YOB: 1980   Provider's Name   [facility]   Medical Record No.  [id number]     Onset Date: 11/02/23 (MD order. Symptoms 1+ years)  Start of Care Date: 11/09/23     Medical Diagnosis:  Hip pain, left (M25.552)      PT Treatment Diagnosis:  Left hip pain Plan of Treatment  Frequency/Duration: 1x/week/ 8 weeks    Certification date from 11/09/23 to 01/04/24         See note for plan of treatment details and functional goals     Cristina Da Silva, PT                         I CERTIFY THE NEED FOR THESE SERVICES FURNISHED UNDER        THIS PLAN OF TREATMENT AND WHILE UNDER MY CARE     (Physician attestation of this document indicates review and certification of the therapy plan).              Referring Provider:  Nupur Majano-Bennie    Initial Assessment  See Epic Evaluation- Start of Care Date: 11/09/23

## 2023-11-10 ENCOUNTER — OFFICE VISIT (OUTPATIENT)
Dept: ORTHOPEDICS | Facility: CLINIC | Age: 43
End: 2023-11-10
Attending: FAMILY MEDICINE
Payer: COMMERCIAL

## 2023-11-10 VITALS — SYSTOLIC BLOOD PRESSURE: 110 MMHG | DIASTOLIC BLOOD PRESSURE: 68 MMHG

## 2023-11-10 DIAGNOSIS — M25.552 GREATER TROCHANTERIC PAIN SYNDROME OF LEFT LOWER EXTREMITY: Primary | ICD-10-CM

## 2023-11-10 PROCEDURE — 99203 OFFICE O/P NEW LOW 30 MIN: CPT | Performed by: STUDENT IN AN ORGANIZED HEALTH CARE EDUCATION/TRAINING PROGRAM

## 2023-11-10 NOTE — LETTER
11/10/2023         RE: Yossi Sky  [address]  [place] [address]        Dear Colleague,    Thank you for referring your patient, Yossi Sky, to the [facility] Please see a copy of my visit note below.    ASSESSMENT & PLAN    Yossi was seen today for pain.    Diagnoses and all orders for this visit:    Greater trochanteric pain syndrome of left lower extremity  -     Orthopedic  Referral      This issue is chronic and Worsening. Yossi presents to clinic today with chronic left hip pain.  Radiographs were reviewed with the patient today and show no signs of degenerative disease.  On exam, she has tenderness to palpation over the greater trochanter and pain with resisted abduction, consistent with greater trochanteric pain syndrome.  In addition, she has marked weakness of abduction and extension, which is likely contributing to her symptoms.  We discussed the above findings and the importance of proper biomechanics and muscle strengthening in treatment of this problem.  We determined the following plan:  -Patient is already scheduled with physical therapy, she will continue to work with them  -She can continue to use over-the-counter pain medications, ice, heat as needed  -She will follow-up in our clinic if no improvement in 4 to 6 weeks.  At that time could consider CSI to greater trochanter bursa versus MRI and further procedural planning.    Ventura Connor DO  [facility]    -----  Chief Complaint   Patient presents with     Left Hip - Pain       SUBJECTIVE  Yossi Sky is a/an 43 year old female who is seen in consultation at the request of  Nupur Majano-*  M.D. for evaluation of left hip pain.     The patient is seen by themselves.    Onset: > 1 years(s) ago. Reports insidious onset without acute precipitating event.  Location of Pain: left hip, posterolateral  margin, it feels deep.  Worsened by: walking, laying on the left side.   Better with: heat pack, tylenol, rest.   Treatments tried: rest/activity avoidance, heat, and Tylenol, PT (just started, 1 visit)  Associated symptoms: deep aching pain with throbbing at times. It has radiated down past the knee before.     Orthopedic/Surgical history: NO, also denies H/o low back injuries.   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       REVIEW OF SYSTEMS:  Review of systems negative unless mentioned in HPI     OBJECTIVE:  /68   LMP 10/30/2023 (Approximate)    General: healthy, alert and in no distress  Skin: no suspicious lesions or rash.  CV: distal perfusion intact   Resp: normal respiratory effort without conversational dyspnea   Psych: normal mood and affect  Gait: NORMAL  Neuro: Normal light sensory exam of LL extremity     Hip Exam:    Musculoskeletal Exam   Gait Normal     Left Right   Inspection Grossly Normal  Grossly Normal    Palpation     Tenderness over  Greater trochanter None   Range of Motion     Flexion - Supine  Full to about 90  Full to about 90   ER at 90 of flexion 55 55   IR at 90 of flexion 40 40   Strength 5/5 Flexion  4/5 Abduction in Neutral  3/5 Abduction in Extension    Grossly Nml otherwise  5/5 Flexion  5/5 Abduction in Neutral  5/5 Abduction in Extension    Grossly Nml otherwise    Pain Provocation Tests     FADIR NEG NEG   FELI NEG  NEG    Instability/log roll NEG  NEG    Trochanteric Pain Sign POS NEG    Straight leg raise (passive) NEG  NEG    Posterior/Ischiofemoral Impingement Provocation NEG  NEG    Neurologic Intact sensation          RADIOLOGY:  Final results and radiologist's interpretation, available in the [facility] health record.  Images were reviewed with the patient in the office today.  My personal interpretation of the performed imaging: Well maintained joint spaces of the left hip. No bony abnormality.       Again, thank you for  allowing me to participate in the care of your patient.        Sincerely,        Ventura Connor, DO

## 2023-11-13 PROBLEM — M25.552 HIP PAIN, LEFT: Status: ACTIVE | Noted: 2023-11-13

## 2023-11-16 ENCOUNTER — THERAPY VISIT (OUTPATIENT)
Dept: PHYSICAL THERAPY | Facility: CLINIC | Age: 43
End: 2023-11-16
Attending: FAMILY MEDICINE
Payer: COMMERCIAL

## 2023-11-16 DIAGNOSIS — M25.552 HIP PAIN, LEFT: Primary | ICD-10-CM

## 2023-11-16 PROCEDURE — 97110 THERAPEUTIC EXERCISES: CPT | Mod: GP

## 2023-11-16 PROCEDURE — 97112 NEUROMUSCULAR REEDUCATION: CPT | Mod: GP

## 2023-11-22 ENCOUNTER — THERAPY VISIT (OUTPATIENT)
Dept: PHYSICAL THERAPY | Facility: CLINIC | Age: 43
End: 2023-11-22
Attending: FAMILY MEDICINE
Payer: COMMERCIAL

## 2023-11-22 DIAGNOSIS — M25.552 HIP PAIN, LEFT: Primary | ICD-10-CM

## 2023-11-22 PROCEDURE — 97140 MANUAL THERAPY 1/> REGIONS: CPT | Mod: GP | Performed by: PHYSICAL THERAPIST

## 2023-11-22 PROCEDURE — 97110 THERAPEUTIC EXERCISES: CPT | Mod: GP | Performed by: PHYSICAL THERAPIST

## 2023-12-04 ENCOUNTER — THERAPY VISIT (OUTPATIENT)
Dept: PHYSICAL THERAPY | Facility: CLINIC | Age: 43
End: 2023-12-04
Attending: FAMILY MEDICINE
Payer: COMMERCIAL

## 2023-12-04 DIAGNOSIS — M25.552 HIP PAIN, LEFT: Primary | ICD-10-CM

## 2023-12-04 PROCEDURE — 97110 THERAPEUTIC EXERCISES: CPT | Mod: GP

## 2023-12-04 PROCEDURE — 97140 MANUAL THERAPY 1/> REGIONS: CPT | Mod: GP

## 2023-12-11 ENCOUNTER — THERAPY VISIT (OUTPATIENT)
Dept: PHYSICAL THERAPY | Facility: CLINIC | Age: 43
End: 2023-12-11
Attending: FAMILY MEDICINE
Payer: COMMERCIAL

## 2023-12-11 DIAGNOSIS — M25.552 HIP PAIN, LEFT: Primary | ICD-10-CM

## 2023-12-11 PROCEDURE — 97140 MANUAL THERAPY 1/> REGIONS: CPT | Mod: GP

## 2023-12-11 PROCEDURE — 97112 NEUROMUSCULAR REEDUCATION: CPT | Mod: GP

## 2023-12-11 PROCEDURE — 97110 THERAPEUTIC EXERCISES: CPT | Mod: GP

## 2024-01-23 ENCOUNTER — THERAPY VISIT (OUTPATIENT)
Dept: PHYSICAL THERAPY | Facility: CLINIC | Age: 44
End: 2024-01-23
Payer: COMMERCIAL

## 2024-01-23 DIAGNOSIS — M25.552 HIP PAIN, LEFT: Primary | ICD-10-CM

## 2024-01-23 PROCEDURE — 97140 MANUAL THERAPY 1/> REGIONS: CPT | Mod: GP

## 2024-01-23 PROCEDURE — 97110 THERAPEUTIC EXERCISES: CPT | Mod: GP

## 2024-01-23 NOTE — PROGRESS NOTES
01/23/24 0500   Appointment Info   Signing clinician's name / credentials Karlee Womack, PT, DPT   Total/Authorized Visits 8   Visits Used 6   Medical Diagnosis Hip pain, left (M25.552)   PT Tx Diagnosis Left hip pain   Quick Adds Certification   Progress Note/Certification   Start of Care Date 11/09/23   Onset of illness/injury or Date of Surgery 11/02/23  (MD order. Symptoms 1+ years)   Therapy Frequency 1x/week   Predicted Duration 6 weeks  (6 additional weeks.)   Certification date from 01/05/24   Certification date to 02/16/24   PT Goal 1   Goal Identifier 1   Goal Description Pt will demosntrate ambulation x 20 minutes without increased symptoms   Rationale to maximize safety and independence within the home;to maximize safety and independence within the community;to maximize safety and independence with performance of ADLs and functional tasks;to maximize safety and independence with self cares   Goal Progress pain with ambulating 10 min.   Target Date 01/04/24   Subjective Report   Subjective Report Yossi possibly broke her toe from stubbing it on a coffee table. She also had a mild back injury flare-up. She has been trying to do HEP but contineus to have achiness and radiating all the way down to her toes. She feels that these minor injuries are leading to a flare-up of her current symptoms but is worried it's not goign to get better.   Objective Measures   Objective Measures Objective Measure 1   Objective Measure 1   Objective Measure Pain   Details 3-4/10   Treatment Interventions (PT)   Interventions Therapeutic Procedure/Exercise;Manual Therapy   Therapeutic Procedure/Exercise   Therapeutic Procedures: strength, endurance, ROM, flexibillity minutes ([id number]) 23   Ther Proc 1 Reviewed Education   Ther Proc 1 - Details Reviewed managment of symptoms, stretching in addition to strengthening to build strong mobility.   PTRx Ther Proc 1 Standing IT Band Stretch Using Wall   PTRx Ther Proc 1 - Details x  20 sec on L    PTRx Ther Proc 2 Single Knee to Chest   PTRx Ther Proc 2 - Details 2 x 15 sec hold.    PTRx Ther Proc 3 Piriformis Stretch Above 90 Degress Supine   PTRx Ther Proc 3 - Details 2 x 15 sec B. - Soreness with L stretching.    PTRx Ther Proc 4 Pretzel Stretch   PTRx Ther Proc 4 - Details figure 4 rocks x 10 B.    PTRx Ther Proc 5 Posterior Pelvic Tilt   PTRx Ther Proc 5 - Details x 10    PTRx Ther Proc 6 Bridging #1   PTRx Ther Proc 6 - Details Bridging #1   PTRx Ther Proc 7 Reverse Clamshell   PTRx Ther Proc 7 - Details HELD   PTRx Ther Proc 8 Hip Abduction Straight Leg Raise   PTRx Ther Proc 8 - Details HELD   PTRx Ther Proc 9 Donkey Kick #2 Standing   PTRx Ther Proc 9 - Details HELD   PTRx Ther Proc 10 Standing Fire Hydrant   PTRx Ther Proc 10 - Details HELD   PTRx Ther Proc 11 Half Kneeling Quadratus Lumborum Stretch   PTRx Ther Proc 11 - Details Reviewed for HEP   PTRx Ther Proc 12 Seated Hamstring Stretch   PTRx Ther Proc 12 - Details Reviewed for HEP   PTRx Ther Proc 13 Standing Hip Flexor Stretch   PTRx Ther Proc 13 - Details Reviewed for HEP   PTRx Ther Proc 14 Lateral Step Down   PTRx Ther Proc 14 - Details Reviewed for HEP.   Skilled Intervention Discussed activity and stretching with emphasis on improtance of strengthening.   Patient Response/Progress pt demonstrated stretches well and asked relevant questions for understanding.   Neuromuscular Re-education   PTRx Neuro Re-ed 1 Monster Walks   PTRx Neuro Re-ed 1 - Details HELD   PTRx Neuro Re-ed 2 Side Stepping With Theraband   PTRx Neuro Re-ed 2 - Details HELD   Skilled Intervention Coached on stabilization and strengthening   Patient Response/Progress Pt tolerated well.   Manual Therapy   Manual Therapy: Mobilization, MFR, MLD, friction massage minutes ([id number]) 12   Manual Therapy Manual Therapy 3   Manual Therapy 1 HELD: Joint mob   Manual Therapy 1 - Details Lateral glides and inferior glides L hip with belt.   Manual Therapy 2 STM   Manual  Therapy 2 - Details Side lying for L piriformis, glut med/min, and TFL.   Skilled Intervention manual therapy for tissue texture and joint mobility.   Patient Response/Progress noting less groin symptoms s/p completion   Manual Therapy 3 pelvic alignment assessment: L posterior rotation   Manual Therapy 3 - Details Corrective EOB MET for anterior rot.   Plan   Home program ptrx   Updates to plan of care Extend POC for 6 weeks with F/U EOW to check progress.   Plan for next session Conotinue progressive strengthening, add balance/SL activities   Total Session Time   Timed Code Treatment Minutes 35   Total Treatment Time (sum of timed and untimed services) 35     [facility]                                                                                   OUTPATIENT PHYSICAL THERAPY    PLAN OF TREATMENT FOR OUTPATIENT REHABILITATION   Patient's Last Name, First Name, Yossi Light YOB: 1980   Provider's Name   [facility]   Medical Record No.  [id number]     Onset Date: 11/02/23 (MD order. Symptoms 1+ years)  Start of Care Date: 11/09/23     Medical Diagnosis:  Hip pain, left (M25.552)      PT Treatment Diagnosis:  Left hip pain Plan of Treatment  Frequency/Duration: 1x/week/ 6 weeks (6 additional weeks.)    Certification date from 01/05/24 to 02/16/24         See note for plan of treatment details and functional goals     Karen Womack, PT                         I CERTIFY THE NEED FOR THESE SERVICES FURNISHED UNDER        THIS PLAN OF TREATMENT AND WHILE UNDER MY CARE     (Physician attestation of this document indicates review and certification of the therapy plan).              Referring Provider:  Nupur Majano-Bennie    Initial Assessment  See Epic Evaluation- Start of Care Date: 11/09/23            PLAN  Continue therapy per current plan of care.    Beginning/End Dates of Progress Note Reporting Period:    to  01/23/2024    Referring Provider:  Nupur Majano-*

## 2024-01-28 DIAGNOSIS — F32.1 CURRENT MODERATE EPISODE OF MAJOR DEPRESSIVE DISORDER WITHOUT PRIOR EPISODE (H): ICD-10-CM

## 2024-01-28 DIAGNOSIS — Z76.0 ENCOUNTER FOR MEDICATION REFILL: ICD-10-CM

## 2024-01-29 RX ORDER — VENLAFAXINE HYDROCHLORIDE 150 MG/1
CAPSULE, EXTENDED RELEASE ORAL
Qty: 90 CAPSULE | Refills: 3 | Status: SHIPPED | OUTPATIENT
Start: 2024-01-29 | End: 2024-06-17

## 2024-02-07 NOTE — TELEPHONE ENCOUNTER
Refill Approved    Rx renewed per Medication Renewal Policy. Medication was last renewed on 12/17/2018 qty 30 refill 11- change in pharmacy, 90 day request.    Josue Zhang, [facility] Connection Triage/Med Refill 9/5/2019     Requested Prescriptions   Pending Prescriptions Disp Refills     venlafaxine 150 mg TR24 90 each 0     Sig: Take 150 mg by mouth daily.       Venlafaxine/Desvenlafaxine Refill Protocol Passed - 9/5/2019  8:05 AM        Passed - LFT or AST or ALT in last year     Albumin   Date Value Ref Range Status   09/28/2018 3.9 3.5 - 5.0 g/dL Final     Bilirubin, Total   Date Value Ref Range Status   09/28/2018 0.5 0.0 - 1.0 mg/dL Final     Bilirubin, Direct   Date Value Ref Range Status   11/15/2011 0.1 <0.6 mg/dL Final     Alkaline Phosphatase   Date Value Ref Range Status   09/28/2018 136 (H) 45 - 120 U/L Final     AST   Date Value Ref Range Status   09/28/2018 17 0 - 40 U/L Final     ALT   Date Value Ref Range Status   09/28/2018 <9 0 - 45 U/L Final     Protein, Total   Date Value Ref Range Status   09/28/2018 6.7 6.0 - 8.0 g/dL Final                Passed - Fasting lipid cascade in last year     Cholesterol   Date Value Ref Range Status   09/28/2018 193 <=199 mg/dL Final     Triglycerides   Date Value Ref Range Status   09/28/2018 64 <=149 mg/dL Final     HDL Cholesterol   Date Value Ref Range Status   09/28/2018 80 >=50 mg/dL Final     LDL Calculated   Date Value Ref Range Status   09/28/2018 100 <=129 mg/dL Final     Patient Fasting > 8hrs?   Date Value Ref Range Status   09/28/2018 No  Final             Passed - PCP or prescribing provider visit in last year     Last office visit with prescriber/PCP: 2/21/2019 Colette Anand MD OR same dept: 9/4/2019 Shana Shah MD OR same specialty: 9/4/2019 Shana Shah MD  Last physical: Visit date not found Last MTM visit: Visit date not found   Next visit within 3 mo: Visit date not found  Next physical within 3 mo: Visit date not  found  Prescriber OR PCP: Colette Anand MD  Last diagnosis associated with med order: There are no diagnoses linked to this encounter.  If protocol passes may refill for 12 months if within 3 months of last provider visit (or a total of 15 months).             Passed - Blood Pressure in last year     BP Readings from Last 1 Encounters:   09/04/19 110/60                          Negative

## 2024-02-08 ENCOUNTER — THERAPY VISIT (OUTPATIENT)
Dept: PHYSICAL THERAPY | Facility: CLINIC | Age: 44
End: 2024-02-08
Payer: COMMERCIAL

## 2024-02-08 DIAGNOSIS — M25.552 HIP PAIN, LEFT: Primary | ICD-10-CM

## 2024-02-08 PROCEDURE — 97140 MANUAL THERAPY 1/> REGIONS: CPT | Mod: GP

## 2024-02-08 PROCEDURE — 97112 NEUROMUSCULAR REEDUCATION: CPT | Mod: GP

## 2024-02-08 PROCEDURE — 97110 THERAPEUTIC EXERCISES: CPT | Mod: GP

## 2024-03-09 ENCOUNTER — HEALTH MAINTENANCE LETTER (OUTPATIENT)
Age: 44
End: 2024-03-09

## 2024-05-07 DIAGNOSIS — Z98.84 HISTORY OF ROUX-EN-Y GASTRIC BYPASS: ICD-10-CM

## 2024-05-08 RX ORDER — OMEPRAZOLE 40 MG/1
CAPSULE, DELAYED RELEASE ORAL
Qty: 90 CAPSULE | Status: SHIPPED | OUTPATIENT
Start: 2024-05-08

## 2024-06-17 ENCOUNTER — OFFICE VISIT (OUTPATIENT)
Dept: FAMILY MEDICINE | Facility: CLINIC | Age: 44
End: 2024-06-17
Attending: FAMILY MEDICINE
Payer: COMMERCIAL

## 2024-06-17 VITALS
HEART RATE: 81 BPM | OXYGEN SATURATION: 96 % | HEIGHT: 64 IN | WEIGHT: 168 LBS | SYSTOLIC BLOOD PRESSURE: 98 MMHG | TEMPERATURE: 98.3 F | BODY MASS INDEX: 28.68 KG/M2 | DIASTOLIC BLOOD PRESSURE: 56 MMHG

## 2024-06-17 DIAGNOSIS — Z98.84 HISTORY OF GASTRIC BYPASS: ICD-10-CM

## 2024-06-17 DIAGNOSIS — Z00.00 ROUTINE GENERAL MEDICAL EXAMINATION AT A HEALTH CARE FACILITY: Primary | ICD-10-CM

## 2024-06-17 DIAGNOSIS — T14.8XXA SKIN ABRASION: ICD-10-CM

## 2024-06-17 DIAGNOSIS — F33.0 MILD EPISODE OF RECURRENT MAJOR DEPRESSIVE DISORDER (H): ICD-10-CM

## 2024-06-17 DIAGNOSIS — Z12.31 VISIT FOR SCREENING MAMMOGRAM: ICD-10-CM

## 2024-06-17 DIAGNOSIS — Z12.4 CERVICAL CANCER SCREENING: ICD-10-CM

## 2024-06-17 DIAGNOSIS — N87.0 CIN I (CERVICAL INTRAEPITHELIAL NEOPLASIA I): ICD-10-CM

## 2024-06-17 DIAGNOSIS — F41.1 ANXIETY STATE: ICD-10-CM

## 2024-06-17 DIAGNOSIS — F17.200 NICOTINE DEPENDENCE, UNCOMPLICATED, UNSPECIFIED NICOTINE PRODUCT TYPE: ICD-10-CM

## 2024-06-17 LAB
ERYTHROCYTE [DISTWIDTH] IN BLOOD BY AUTOMATED COUNT: 11.5 % (ref 10–15)
FERRITIN SERPL-MCNC: 72 NG/ML (ref 6–175)
FOLATE SERPL-MCNC: >40 NG/ML (ref 4.6–34.8)
HCT VFR BLD AUTO: 39.2 % (ref 35–47)
HGB BLD-MCNC: 13.5 G/DL (ref 11.7–15.7)
IRON BINDING CAPACITY (ROCHE): 277 UG/DL (ref 240–430)
IRON SATN MFR SERPL: 52 % (ref 15–46)
IRON SERPL-MCNC: 145 UG/DL (ref 37–145)
MCH RBC QN AUTO: 33.5 PG (ref 26.5–33)
MCHC RBC AUTO-ENTMCNC: 34.4 G/DL (ref 31.5–36.5)
MCV RBC AUTO: 97 FL (ref 78–100)
PLATELET # BLD AUTO: 351 10E3/UL (ref 150–450)
RBC # BLD AUTO: 4.03 10E6/UL (ref 3.8–5.2)
VIT B12 SERPL-MCNC: 2628 PG/ML (ref 232–1245)
VIT D+METAB SERPL-MCNC: 41 NG/ML (ref 20–50)
WBC # BLD AUTO: 6.9 10E3/UL (ref 4–11)

## 2024-06-17 PROCEDURE — 96127 BRIEF EMOTIONAL/BEHAV ASSMT: CPT | Performed by: FAMILY MEDICINE

## 2024-06-17 PROCEDURE — 82728 ASSAY OF FERRITIN: CPT | Performed by: FAMILY MEDICINE

## 2024-06-17 PROCEDURE — 82607 VITAMIN B-12: CPT | Performed by: FAMILY MEDICINE

## 2024-06-17 PROCEDURE — 87624 HPV HI-RISK TYP POOLED RSLT: CPT | Performed by: FAMILY MEDICINE

## 2024-06-17 PROCEDURE — 99396 PREV VISIT EST AGE 40-64: CPT | Performed by: FAMILY MEDICINE

## 2024-06-17 PROCEDURE — G0145 SCR C/V CYTO,THINLAYER,RESCR: HCPCS | Performed by: FAMILY MEDICINE

## 2024-06-17 PROCEDURE — 85027 COMPLETE CBC AUTOMATED: CPT | Performed by: FAMILY MEDICINE

## 2024-06-17 PROCEDURE — 83540 ASSAY OF IRON: CPT | Performed by: FAMILY MEDICINE

## 2024-06-17 PROCEDURE — 82746 ASSAY OF FOLIC ACID SERUM: CPT | Performed by: FAMILY MEDICINE

## 2024-06-17 PROCEDURE — 82306 VITAMIN D 25 HYDROXY: CPT | Performed by: FAMILY MEDICINE

## 2024-06-17 PROCEDURE — 99214 OFFICE O/P EST MOD 30 MIN: CPT | Mod: 25 | Performed by: FAMILY MEDICINE

## 2024-06-17 PROCEDURE — 36415 COLL VENOUS BLD VENIPUNCTURE: CPT | Performed by: FAMILY MEDICINE

## 2024-06-17 PROCEDURE — 83550 IRON BINDING TEST: CPT | Performed by: FAMILY MEDICINE

## 2024-06-17 RX ORDER — VENLAFAXINE HYDROCHLORIDE 150 MG/1
150 CAPSULE, EXTENDED RELEASE ORAL DAILY
Qty: 90 CAPSULE | Refills: 3 | Status: SHIPPED | OUTPATIENT
Start: 2024-06-17

## 2024-06-17 RX ORDER — MUPIROCIN 20 MG/G
OINTMENT TOPICAL 2 TIMES DAILY
Qty: 15 G | Refills: 0 | Status: SHIPPED | OUTPATIENT
Start: 2024-06-17

## 2024-06-17 RX ORDER — VENLAFAXINE HYDROCHLORIDE 75 MG/1
75 CAPSULE, EXTENDED RELEASE ORAL DAILY
Qty: 90 CAPSULE | Refills: 3 | Status: SHIPPED | OUTPATIENT
Start: 2024-06-17

## 2024-06-17 RX ORDER — BUSPIRONE HYDROCHLORIDE 15 MG/1
15 TABLET ORAL 2 TIMES DAILY
Qty: 180 TABLET | Refills: 3 | Status: SHIPPED | OUTPATIENT
Start: 2024-06-17

## 2024-06-17 RX ORDER — NICOTINE 21 MG/24HR
1 PATCH, TRANSDERMAL 24 HOURS TRANSDERMAL EVERY 24 HOURS
Qty: 14 PATCH | Refills: 0 | Status: SHIPPED | OUTPATIENT
Start: 2024-07-29 | End: 2024-08-12

## 2024-06-17 RX ORDER — NICOTINE 21 MG/24HR
1 PATCH, TRANSDERMAL 24 HOURS TRANSDERMAL EVERY 24 HOURS
Qty: 42 PATCH | Refills: 0 | Status: SHIPPED | OUTPATIENT
Start: 2024-06-17 | End: 2024-07-29

## 2024-06-17 RX ORDER — VARENICLINE TARTRATE 1 MG/1
1 TABLET, FILM COATED ORAL 2 TIMES DAILY
Qty: 60 TABLET | Refills: 1 | Status: SHIPPED | OUTPATIENT
Start: 2024-07-16

## 2024-06-17 RX ORDER — VARENICLINE TARTRATE 0.5 MG/1
TABLET, FILM COATED ORAL
Qty: 95 TABLET | Refills: 0 | Status: SHIPPED | OUTPATIENT
Start: 2024-06-17

## 2024-06-17 RX ORDER — MULTIPLE VITAMINS W/ MINERALS TAB 9MG-400MCG
1 TAB ORAL DAILY
COMMUNITY

## 2024-06-17 SDOH — HEALTH STABILITY: PHYSICAL HEALTH: ON AVERAGE, HOW MANY DAYS PER WEEK DO YOU ENGAGE IN MODERATE TO STRENUOUS EXERCISE (LIKE A BRISK WALK)?: 1 DAY

## 2024-06-17 ASSESSMENT — PATIENT HEALTH QUESTIONNAIRE - PHQ9
SUM OF ALL RESPONSES TO PHQ QUESTIONS 1-9: 7
10. IF YOU CHECKED OFF ANY PROBLEMS, HOW DIFFICULT HAVE THESE PROBLEMS MADE IT FOR YOU TO DO YOUR WORK, TAKE CARE OF THINGS AT HOME, OR GET ALONG WITH OTHER PEOPLE: SOMEWHAT DIFFICULT
SUM OF ALL RESPONSES TO PHQ QUESTIONS 1-9: 7

## 2024-06-17 ASSESSMENT — SOCIAL DETERMINANTS OF HEALTH (SDOH): HOW OFTEN DO YOU GET TOGETHER WITH FRIENDS OR RELATIVES?: ONCE A WEEK

## 2024-06-17 NOTE — PATIENT INSTRUCTIONS
"Patient Education   Preventive Care Advice   This is general advice we often give to help people stay healthy. Your care team may have specific advice just for you. Please talk to your care team about your own preventive care needs.  Lifestyle  Exercise at least 150 minutes each week (30 minutes a day, 5 days a week).  Do muscle strengthening activities 2 days a week. These help control your weight and prevent disease.  No smoking.  Wear sunscreen to prevent skin cancer.  Have your home tested for radon every 2 to 5 years. Radon is a colorless, odorless gas that can harm your lungs. To learn more, go to www.health.[facility].us and search for \"Radon in Homes.\"  Keep guns unloaded and locked up in a safe place like a safe or gun vault, or, use a gun lock and hide the keys. Always lock away bullets separately. To learn more, visit Talk[place]pol[employer].mn.gov and search for \"safe gun storage.\"  Nutrition  Eat 5 or more servings of fruits and vegetables each day.  Try wheat bread, brown rice and whole grain pasta (instead of white bread, rice, and pasta).  Get enough calcium and vitamin D. Check the label on foods and aim for 100% of the RDA (recommended daily allowance).  Regular exams  Have a dental exam and cleaning every 6 months.  See your health care team every year to talk about:  Any changes in your health.  Any medicines your care team has prescribed.  Preventive care, family planning, and ways to prevent chronic diseases.  Shots (vaccines)   HPV shots (up to age 26), if you've never had them before.  Hepatitis B shots (up to age 59), if you've never had them before.  COVID-19 shot: Get this shot when it's due.  Flu shot: Get a flu shot every year.  Tetanus shot: Get a tetanus shot every 10 years.  Pneumococcal, hepatitis A, and RSV shots: Ask your care team if you need these based on your risk.  Shingles shot (for age 50 and up).  General health tests  Diabetes screening:  Starting at age 35, Get screened for diabetes at least " every 3 years.  If you are younger than age 35, ask your care team if you should be screened for diabetes.  Cholesterol test: At age 39, start having a cholesterol test every 5 years, or more often if advised.  Bone density scan (DEXA): At age 50, ask your care team if you should have this scan for osteoporosis (brittle bones).  Hepatitis C: Get tested at least once in your life.  Abdominal aortic aneurysm screening: Talk to your doctor about having this screening if you:  Have ever smoked; and  Are biologically male; and  Are between the ages of 65 and 75.  STIs (sexually transmitted infections)  Before age 24: Ask your care team if you should be screened for STIs.  After age 24: Get screened for STIs if you're at risk. You are at risk for STIs (including HIV) if:  You are sexually active with more than one person.  You don't use condoms every time.  You or a partner was diagnosed with a sexually transmitted infection.  If you are at risk for HIV, ask about PrEP medicine to prevent HIV.  Get tested for HIV at least once in your life, whether you are at risk for HIV or not.  Cancer screening tests  Cervical cancer screening: If you have a cervix, begin getting regular cervical cancer screening tests at age 21. Most people who have regular screenings with normal results can stop after age 65. Talk about this with your provider.  Breast cancer scan (mammogram): If you've ever had breasts, begin having regular mammograms starting at age 40. This is a scan to check for breast cancer.  Colon cancer screening: It is important to start screening for colon cancer at age 45.  Have a colonoscopy test every 10 years (or more often if you're at risk) Or, ask your provider about stool tests like a FIT test every year or Cologuard test every 3 years.  To learn more about your testing options, visit: www.WiserTogether/026410.pdf.  For help making a decision, visit: felipe/fb59130.  Prostate cancer screening test: If you have a  prostate and are age 55 to 69, ask your provider if you would benefit from a yearly prostate cancer screening test.  Lung cancer screening: If you are a current or former smoker age 50 to 80, ask your care team if ongoing lung cancer screenings are right for you.  For informational purposes only. Not to replace the advice of your health care provider. Copyright   2023 [facility]. All rights reserved. Clinically reviewed by the [facility] Transitions Program. [employer] 204550 - REV 04/24.  Learning About Depression Screening  What is depression screening?  Depression screening is a way to see if you have depression symptoms. It may be done by a doctor or counselor. It's often part of a routine checkup. That's because your mental health is just as important as your physical health.  Depression is a mental health condition that affects how you feel, think, and act. You may:  Have less energy.  Lose interest in your daily activities.  Feel sad and grouchy for a long time.  Depression is very common. It affects people of all ages.  Many things can lead to depression. Some people become depressed after they have a stroke or find out they have a major illness like cancer or heart disease. The death of a loved one or a breakup may lead to depression. It can run in families. Most experts believe that a combination of inherited genes and stressful life events can cause it.  What happens during screening?  You may be asked to fill out a form about your depression symptoms. You and the doctor will discuss your answers. The doctor may ask you more questions to learn more about how you think, act, and feel.  What happens after screening?  If you have symptoms of depression, your doctor will talk to you about your options.  Doctors usually treat depression with medicines or counseling. Often, combining the two works best. Many people don't get help because they think that they'll get over the depression on  "their own. But people with depression may not get better unless they get treatment.  The cause of depression is not well understood. There may be many factors involved. But if you have depression, it's not your fault.  A serious symptom of depression is thinking about death or suicide. If you or someone you care about talks about this or about feeling hopeless, get help right away.  It's important to know that depression can be treated. Medicine, counseling, and self-care may help.  Where can you learn more?  Go to https://www.Neov[employer].net/patiented  Enter T185 in the search box to learn more about \"Learning About Depression Screening.\"  Current as of: June 24, 2023               Content Version: 14.0    7920-9350 [employer].   Care instructions adapted under license by your healthcare professional. If you have questions about a medical condition or this instruction, always ask your healthcare professional. [employer] disclaims any warranty or liability for your use of this information.      Safer Sex: Care Instructions  Overview  Safer sex is a way to reduce your risk of getting a sexually transmitted infection (STI). It can also help prevent pregnancy.  Several products can help you practice safer sex and reduce your chance of STIs. One of the best is a condom. There are internal and external condoms. You can use a special rubber sheet (dental dam) for protection during oral sex. Disposable gloves can keep your hands from touching blood, semen, or other body fluids that can carry infections.  Remember that birth control methods such as diaphragms, IUDs, foams, and birth control pills do not stop you from getting STIs.  Follow-up care is a key part of your treatment and safety. Be sure to make and go to all appointments, and call your doctor if you are having problems. It's also a good idea to know your test results and keep a list of the medicines you take.  How can you care for " "yourself at home?  Think about getting vaccinated to help prevent hepatitis A, hepatitis B, and human papillomavirus (HPV). They can be spread through sex.  Use a condom every time you have sex. Use an external condom, which goes on the penis. Or use an internal condom, which goes into the vagina or anus.  Make sure you use the right size external condom. A condom that's too small can break easily. A condom that's too big can slip off during sex.  Use a new condom each time you have sex. Be careful not to poke a hole in the condom when you open the wrapper.  Don't use an internal condom and an external condom at the same time.  Never use petroleum jelly (such as Vaseline), grease, hand lotion, baby oil, or anything with oil in it. These products can make holes in the condom.  After intercourse, hold the edge of the condom as you remove it. This will help keep semen from spilling out of the condom.  Do not have sex with anyone who has symptoms of an STI, such as sores on the genitals or mouth.  Do not drink a lot of alcohol or use drugs before sex.  Limit your sex partners. Sex with one partner who has sex only with you can reduce your risk of getting an STI.  Don't share sex toys. But if you do share them, use a condom and clean the sex toys between each use.  Talk to any partners before you have sex. Talk about what you feel comfortable with and whether you have any boundaries with sex. And find out if your partner or partners may be at risk for any STI. Keep in mind that a person may be able to spread an STI even if they do not have symptoms. You and any partners may want to get tested for STIs.  Where can you learn more?  Go to https://www.healthKnok.net/patiented  Enter B608 in the search box to learn more about \"Safer Sex: Care Instructions.\"  Current as of: November 27, 2023               Content Version: 14.0    1981-4121 [employer].   Care instructions adapted under license by your healthcare " professional. If you have questions about a medical condition or this instruction, always ask your healthcare professional. [employer] disclaims any warranty or liability for your use of this information.             Nicotine Transdermal System   Habitrol, Nicoderm C-Q    Uses  For quitting smoking.    Instructions  DO NOT take this medicine by mouth.    Avoid placing the patch near the breast.    Remove the patch after 24 hours.    Keep the medicine at room temperature. Avoid heat and direct light.    This patch should not be cut.    Wash your hands before and after handling this medicine.    Remove old patch before applying new one. Change the location of the new patch.    If you have vivid dreams or trouble sleeping, you may remove the patch before going to sleep.    Ask your doctor or pharmacist about locations on your body where this patch can be used.    Remove the plastic liner that protects the sticky side of the patch before applying to the skin.    Be sure the area of skin is clean and dry before putting on a new patch.    Apply the patch to a clean, dry, hairless area.    Press the patch firmly for a few seconds to make sure it stays in place.    After removing the patch, fold it together and discard it out of reach of children and pets.    Please ask your doctor or pharmacist how you can safely dispose of used patches.    If the skin under the patch becomes irritated, remove the patch. Do not apply a new patch to the area until the skin feels better.    To avoid irritating your skin, use a different location for a new patch.    Apply the patch only to normal looking skin. Avoid areas of the skin that are red, have scrapes, or damaged.    If the patch falls off, apply a new a patch on a different location of the body.    Please tell your doctor and pharmacist about all the medicines you take. Include both prescription and over-the-counter medicines. Also tell them about any vitamins, herbal  medicines, or anything else you take for your health.    If you need to stop this medicine, your doctor may wish to gradually reduce the dosage before stopping.    Do not use more than 1 patch at any one time.    Cautions  Tell your doctor and pharmacist if you ever had an allergic reaction to a medicine. Symptoms of an allergic reaction can include trouble breathing, skin rash, itching, swelling, or severe dizziness.    Do not use the medication any more than instructed.    Avoid smoking while on this medicine. Smoking may increase your risk for stroke, heart attack, blood clots, high blood pressure, and other diseases of the heart and blood vessels.    Tell the doctor or pharmacist if you are pregnant, planning to be pregnant, or breastfeeding.    Ask your pharmacist if this medicine can interact with any of your other medicines. Be sure to tell them about all the medicines you take.    Please tell all your doctors and dentists that you are on this medicine before they provide care.    Side Effects  The following is a list of some common side effects from this medicine. Please speak with your doctor about what you should do if you experience these or other side effects.    skin irritation where medicine is applied    If you have any of the following side effects, you may be getting too much medicine. Please contact your doctor to let them know about these side effects.    diarrhea  dizziness  nausea  rapid heartbeat  vomiting    A few people may have an allergic reaction to this medicine. Symptoms can include difficulty breathing, skin rash, itching, swelling, or severe dizziness. If you notice any of these symptoms, seek medical help quickly.    Extra  Please speak with your doctor, nurse, or pharmacist if you have any questions about this medicine.      [email or web address]  IMPORTANT NOTE: This document tells you briefly how to take your medicine, but it does not tell you all there is  to know about it. Your doctor or pharmacist may give you other documents about your medicine. Please talk to them if you have any questions. Always follow their advice. There is a more complete description of this medicine available in English. Scan this code on your smartphone or tablet or use the web address below. You can also ask your pharmacist for a printout. If you have any questions, please ask your pharmacist.   2021 [employer].      3840-6731 [employer]. All rights reserved. This information is not intended as a substitute for professional medical care. Always follow your healthcare professional's instructions.

## 2024-06-17 NOTE — PROGRESS NOTES
Preventive Care Visit  [facility] PETTYEFREM Galindo MD, Family Medicine  Jun 17, 2024      Assessment & Plan     Routine general medical examination at a health care facility  Labs, screenings, and vaccines as ordered and counseling as detailed below. Counseled on risks and benefits of vaccination and my recommendation for vaccination; patient's informed decision was to decline covid vaccine today. Informed patient of how to obtain in future if they desire.    Visit for screening mammogram  No family history of breast cancer, she has never had a mammo before.  - MA Screening Bilateral w/ Patricio; Future    Cervical cancer screening  BUTCH I (cervical intraepithelial neoplasia I) 2009  See pap history below. Last pap was 2019 and was NILM/neg HPV. Will see what these results are to determine interval for follow up.  - Pap Screen with HPV - Recommended Age 30 - 65 Years    Skin abrasion  On left lower leg, decreasing in size per patient. No evidence of abscess or cellulitis. Start mupirocin BID for 5 days, reviewed reasons to seek care. No former healing difficulties.  - mupirocin (BACTROBAN) 2 % external ointment; Apply topically 2 times daily    Nicotine dependence, uncomplicated, unspecified nicotine product type  She has smoked 0.5 pack per day for 9 years, had quit for about a year prior to that, and previously smoked about 0.25 packs per day. She is motivated to quit, and would like to try chantix with gradual quit, with nicotine patches.  - varenicline (CHANTIX) 0.5 MG tablet; Take 0.5 mg (1 tablet) once a day for 3 days, THEN 0.5 mg (1 tablet) twice daily for 4 days, THEN 1.0 mg (2 tablets) twice daily  - varenicline (CHANTIX) 1 MG tablet; Take 1 tablet (1 mg) by mouth 2 times daily  - nicotine (NICODERM CQ) 21 MG/24HR 24 hr patch; Place 1 patch onto the skin every 24 hours for 42 days  - nicotine (NICODERM CQ) 14 MG/24HR 24 hr patch; Place 1 patch onto the skin every 24 hours for  "14 days  - nicotine (NICODERM CQ) 7 MG/24HR 24 hr patch; Place 1 patch onto the skin every 24 hours for 14 days    Mild episode of recurrent major depressive disorder (H24)  Anxiety  She feels incresaed depessive and anxious symptoms. She would like to try to increase venlafaxine. She denies suicidal ideation. She will try the higher dose, knows it can take 6-8w to see effect, and check in via mychart or evisit.  - busPIRone (BUSPAR) 15 MG tablet; Take 1 tablet (15 mg) by mouth 2 times daily  - venlafaxine (EFFEXOR XR) 150 MG 24 hr capsule; Take 1 capsule (150 mg) by mouth daily Take with 75mg capsule for total of 225mg daily.  - venlafaxine (EFFEXOR XR) 75 MG 24 hr capsule; Take 1 capsule (75 mg) by mouth daily    History of gastric bypass  Check labs.  - CBC with platelets; Future  - Ferritin; Future  - Iron and iron binding capacity; Future  - Vitamin B12; Future  - Folate; Future  - Vitamin D Deficiency; Future  - CBC with platelets  - Ferritin  - Iron and iron binding capacity  - Vitamin B12  - Folate  - Vitamin D Deficiency    Patient has been advised of split billing requirements and indicates understanding: Yes        BMI  Estimated body mass index is 28.75 kg/m  as calculated from the following:    Height as of this encounter: 1.628 m (5' 4.09\").    Weight as of this encounter: 76.2 kg (168 lb).   Weight management plan: Discussed healthy diet and exercise guidelines    Counseling  Appropriate preventive services were discussed with this patient, including applicable screening as appropriate for fall prevention, nutrition, physical activity, Tobacco-use cessation, weight loss and cognition.  Checklist reviewing preventive services available has been given to the patient.  Reviewed patient's diet, addressing concerns and/or questions.   She is at risk for lack of exercise and has been provided with information to increase physical activity for the benefit of her well-being.   The patient's PHQ-9 score is " consistent with mild depression. She was provided with information regarding depression.       Follow up in 1y for annual preventive, sooner prn depending on depression.    Rafaela Sy is a 44 year old, presenting for the following:  Physical        6/17/2024     9:01 AM   Additional Questions   Roomed by Colette De La Cruz   Accompanied by Self        Health Care Directive  Patient does not have a Health Care Directive or Living Will: not discussed    HPI    Quit smoking in the past, did chantix and felt it helped  Has a spot on her lower left leg that hasn't healed for past month or so, sh has tried antibiotic ointment, it is getting smaller, started out quarter sized; has MRSA in nares              6/17/2024   General Health   How would you rate your overall physical health? Good   Feel stress (tense, anxious, or unable to sleep) To some extent   (!) STRESS CONCERN      6/17/2024   Nutrition   Three or more servings of calcium each day? Yes   Diet: Regular (no restrictions)   How many servings of fruit and vegetables per day? (!) 2-3   How many sweetened beverages each day? 0-1         6/17/2024   Exercise   Days per week of moderate/strenous exercise 1 day   (!) EXERCISE CONCERN      6/17/2024   Social Factors   Frequency of gathering with friends or relatives Once a week   Worry food won't last until get money to buy more No   Food not last or not have enough money for food? No   Do you have housing?  Yes   Are you worried about losing your housing? No   Lack of transportation? No   Unable to get utilities (heat,electricity)? No         6/17/2024   Dental   Dentist two times every year? Yes         6/17/2024   TB Screening   Were you born outside of the [place]? No       Today's PHQ-9 Score:       6/17/2024     8:53 AM   PHQ-9 SCORE   PHQ-9 Total Score MyChart 7 (Mild depression)   PHQ-9 Total Score 7         6/17/2024   Substance Use   Alcohol more than 3/day or more than 7/wk Not Applicable   Do you use any other  substances recreationally? No     Social History     Tobacco Use    Smoking status: Every Day     Current packs/day: 0.50     Average packs/day: 0.3 packs/day for 25.2 years (8.6 ttl pk-yrs)     Types: Cigarettes     Start date: 1998     Last attempt to quit: 2014    Smokeless tobacco: Never    Tobacco comments:     1/2 pack per day   Vaping Use    Vaping status: Never Used   Substance Use Topics    Alcohol use: Not Currently     Comment: Stopped August 2023    Drug use: No             6/17/2024   Breast Cancer Screening   Family history of breast, colon, or ovarian cancer? No / Unknown      Mammogram Screening - Mammogram every 1-2 years updated in Health Maintenance based on mutual decision making        6/17/2024   STI Screening   New sexual partner(s) since last STI/HIV test? (!) YES - declines STD testing     History of abnormal Pap smear: YES - other categories - see link Cervical Cytology Screening Guidelines  2009 LSIL pap  2009 colpo biopsy CIN1, ECC negative  2010 ASCUS  2011 ASCUS, HR HPV pos  2011 colpo NILM  2012 ASCUS, HPV neg  2016 NILM  2019 NILM, HPV neg        Latest Ref Rng & Units 4/16/2019     4:48 PM 2/16/2016     3:59 PM   PAP / HPV   PAP Negative for squamous intraepithelial lesion or malignancy. Negative for squamous intraepithelial lesion or malignancy  Electronically signed by Johana Padgett CT (ASCP) on 4/23/2019 at  1:30 PM    Negative for squamous intraepithelial lesion or malignancy  Electronically signed by Delia Jackson CT (ASCP) on 2/23/2016 at  2:41 PM      HPV 16 DNA NEG Negative     HPV 18 DNA NEG Negative     Other HR HPV NEG Negative       ASCVD Risk   The 10-year ASCVD risk score (Renita MCKINNEY, et al., 2019) is: 1.1%    Values used to calculate the score:      Age: 44 years      Sex: Female      Is Non- : No      Diabetic: No      Tobacco smoker: Yes      Systolic Blood Pressure: 98 mmHg      Is BP treated: No      HDL Cholesterol: 70  mg/dL      Total Cholesterol: 204 mg/dL        2024   Contraception/Family Planning   Questions about contraception or family planning No, boyfriend has had vasectomy        Reviewed and updated as needed this visit by Provider   Tobacco  Allergies  Meds  Problems  Med Hx  Surg Hx  Fam Hx            OB History    Para Term  AB Living   0 0 0 0 0 0   SAB IAB Ectopic Multiple Live Births   0 0 0 0 0     Patient Active Problem List   Diagnosis    Mild episode of recurrent major depressive disorder (H24)    Herpes Simplex Type II    Anxiety    Migraine Headache    Other and unspecified postsurgical nonabsorption    Tobacco use    Hip pain, left     Past Surgical History:   Procedure Laterality Date    AR LAP GASTRIC BYPASS/JUSTIN-EN-Y N/A 9/15/2014    Procedure: LAPAROSCOPIC, POSSIBLE OPEN,  JUSTIN-EN-Y GASTRIC BYPASS ;  Surgeon: Nickolas Slaughter MD;  Location: [facility];  Service: General    WISDOM TOOTH EXTRACTION      ZZC LAP,CHOLECYSTECTOMY/EXPLORE      Description: Cholecystectomy Laparoscopic;  Recorded: 2011;       Social History     Tobacco Use    Smoking status: Every Day     Current packs/day: 0.50     Average packs/day: 0.3 packs/day for 25.2 years (8.6 ttl pk-yrs)     Types: Cigarettes     Start date:      Last attempt to quit:     Smokeless tobacco: Never    Tobacco comments:     1/2 pack per day   Substance Use Topics    Alcohol use: Not Currently     Comment: Stopped 2023     Family History   Problem Relation Age of Onset    Arthritis Mother     Skin Cancer Mother     Cerebrovascular Disease Mother 68    Diabetes Father     Hyperlipidemia Father     Hypertension Father     Heart Disease Father     Obesity Father     Sleep Apnea Father     Kidney Disease Father     Depression Father     Coronary Artery Disease Father 50 - 59    Cerebrovascular Disease Father     Heart Disease Maternal Grandmother     No Known Problems Paternal Half-Brother      "Breast Cancer No family hx of     Colon Cancer No family hx of          Current Outpatient Medications   Medication Sig Dispense Refill    busPIRone (BUSPAR) 15 MG tablet Take 1 tablet (15 mg) by mouth 2 times daily . SEE YOUR DOCTOR FOR FURTHER REFILLS. 180 tablet 0    cetirizine (ZYRTEC) 10 MG tablet [CETIRIZINE (ZYRTEC) 10 MG TABLET] Take 20 mg by mouth daily.      cholecalciferol, vitamin D3, 5,000 unit Tab [CHOLECALCIFEROL, VITAMIN D3, 5,000 UNIT TAB] Take 1 tablet by mouth daily.      ferrous sulfate 325 (65 FE) MG tablet [FERROUS SULFATE 325 (65 FE) MG TABLET] Take 1 tablet by mouth daily with breakfast.      Lactobac no.41/Bifidobact no.7 (PROBIOTIC-10 ORAL) [LACTOBAC NO.41/BIFIDOBACT NO.7 (PROBIOTIC-10 ORAL)] Take by mouth.      multivitamin w/minerals (THERA-VIT-M) tablet Take 1 tablet by mouth daily      omeprazole (PRILOSEC) 40 MG DR capsule TAKE ONE CAPSULE BY MOUTH ONE TIME DAILY 90 capsule PRN    venlafaxine (EFFEXOR XR) 150 MG 24 hr capsule TAKE ONE CAPSULE BY MOUTH ONE TIME DAILY 90 capsule 3     Allergies   Allergen Reactions    Sulfamethoxazole-Trimethoprim Hives    Bentyl [Dicyclomine] Other (See Comments)     Patient felt extremely angry    Nsaids (Non-Steroidal Anti-Inflammatory Drug) [Nsaids] Nausea     GI upset    Sulfa (Sulfonamide Antibiotics) [Sulfa Antibiotics] Hives    Adhesive [Cyanoacrylate] Itching and Rash    Benadryl [Diphenhydramine] Anxiety     \"hyper\"         Review of Systems  Constitutional, HEENT, cardiovascular, pulmonary, gi and gu systems are negative, except as otherwise noted.     Objective    Exam  BP 98/56 (BP Location: Right arm, Patient Position: Sitting, Cuff Size: Adult Regular)   Pulse 81   Temp 98.3  F (36.8  C) (Oral)   Ht 1.628 m (5' 4.09\")   Wt 76.2 kg (168 lb)   LMP 06/12/2024 (Exact Date)   SpO2 96%   BMI 28.75 kg/m     Estimated body mass index is 28.75 kg/m  as calculated from the following:    Height as of this encounter: 1.628 m (5' 4.09\").    " Weight as of this encounter: 76.2 kg (168 lb).    Physical Exam  GENERAL: alert and no distress  EYES: Eyes grossly normal to inspection, PERRL and conjunctivae and sclerae normal  HENT: ear canals and TM's normal, nose and mouth without ulcers or lesions  NECK: no adenopathy, no asymmetry, masses, or scars  RESP: lungs clear to auscultation - no rales, rhonchi or wheezes  CV: regular rate and rhythm, normal S1 S2, no S3 or S4, no murmur, click or rub, no peripheral edema  ABDOMEN: soft, nontender, no hepatosplenomegaly, no masses and bowel sounds normal   (female): normal female external genitalia, normal urethral meatus, normal vaginal mucosa  MS: no gross musculoskeletal defects noted, no edema  SKIN: no suspicious lesions or rashes, approx 2mm abrasion inner left lower leg with slight surrounding erythema but no induration or fluctuance or drainage  NEURO: Normal strength and tone, mentation intact and speech normal  PSYCH: mentation appears normal, affect normal/bright        Signed Electronically by: Nupur Galindo MD    Answers submitted by the patient for this visit:  Patient Health Questionnaire (Submitted on 6/17/2024)  If you checked off any problems, how difficult have these problems made it for you to do your work, take care of things at home, or get along with other people?: Somewhat difficult  PHQ9 TOTAL SCORE: 7

## 2024-06-18 ENCOUNTER — TELEPHONE (OUTPATIENT)
Dept: MAMMOGRAPHY | Facility: CLINIC | Age: 44
End: 2024-06-18
Payer: COMMERCIAL

## 2024-06-18 LAB
HPV HR 12 DNA CVX QL NAA+PROBE: NEGATIVE
HPV16 DNA CVX QL NAA+PROBE: NEGATIVE
HPV18 DNA CVX QL NAA+PROBE: NEGATIVE
HUMAN PAPILLOMA VIRUS FINAL DIAGNOSIS: NORMAL

## 2024-06-18 NOTE — TELEPHONE ENCOUNTER
Patient Quality Outreach    Patient is due for the following:   Breast Cancer Screening - Mammogram    Next Steps:   Patient was scheduled for mammo    Type of outreach:    Phone, spoke to patient/parent. 6/20 at 8:30    Next Steps:  Reach out within 90 days via Phone.    Max number of attempts reached: Yes. Will try again in 90 days if patient still on fail list.    Questions for provider review:    None           DEBI Elmore  Chart routed to Care Team.

## 2024-06-20 ENCOUNTER — ANCILLARY PROCEDURE (OUTPATIENT)
Dept: MAMMOGRAPHY | Facility: CLINIC | Age: 44
End: 2024-06-20
Attending: FAMILY MEDICINE
Payer: COMMERCIAL

## 2024-06-20 DIAGNOSIS — Z12.31 VISIT FOR SCREENING MAMMOGRAM: ICD-10-CM

## 2024-06-20 LAB
BKR LAB AP GYN ADEQUACY: NORMAL
BKR LAB AP GYN INTERPRETATION: NORMAL
BKR LAB AP LMP: NORMAL
BKR LAB AP PREVIOUS ABNORMAL: NORMAL
PATH REPORT.COMMENTS IMP SPEC: NORMAL
PATH REPORT.COMMENTS IMP SPEC: NORMAL
PATH REPORT.RELEVANT HX SPEC: NORMAL

## 2024-06-20 PROCEDURE — 77067 SCR MAMMO BI INCL CAD: CPT | Mod: TC | Performed by: RADIOLOGY

## 2024-06-21 PROBLEM — N87.0 CIN I (CERVICAL INTRAEPITHELIAL NEOPLASIA I): Status: RESOLVED | Noted: 2024-06-17 | Resolved: 2024-06-21

## 2024-06-26 ENCOUNTER — ANCILLARY PROCEDURE (OUTPATIENT)
Dept: MAMMOGRAPHY | Facility: CLINIC | Age: 44
End: 2024-06-26
Attending: FAMILY MEDICINE
Payer: COMMERCIAL

## 2024-06-26 DIAGNOSIS — N64.89 BREAST ASYMMETRY: ICD-10-CM

## 2024-06-26 PROCEDURE — 76642 ULTRASOUND BREAST LIMITED: CPT | Mod: LT

## 2024-06-26 PROCEDURE — 77062 BREAST TOMOSYNTHESIS BI: CPT

## 2024-07-22 ENCOUNTER — OFFICE VISIT (OUTPATIENT)
Dept: FAMILY MEDICINE | Facility: CLINIC | Age: 44
End: 2024-07-22
Payer: COMMERCIAL

## 2024-07-22 VITALS
BODY MASS INDEX: 27.76 KG/M2 | RESPIRATION RATE: 12 BRPM | HEIGHT: 64 IN | DIASTOLIC BLOOD PRESSURE: 63 MMHG | TEMPERATURE: 98.7 F | HEART RATE: 80 BPM | SYSTOLIC BLOOD PRESSURE: 99 MMHG | OXYGEN SATURATION: 95 % | WEIGHT: 162.6 LBS

## 2024-07-22 DIAGNOSIS — R19.7 DIARRHEA, UNSPECIFIED TYPE: Primary | ICD-10-CM

## 2024-07-22 LAB — HGB BLD-MCNC: 13 G/DL (ref 11.7–15.7)

## 2024-07-22 PROCEDURE — 99214 OFFICE O/P EST MOD 30 MIN: CPT | Performed by: FAMILY MEDICINE

## 2024-07-22 PROCEDURE — 36415 COLL VENOUS BLD VENIPUNCTURE: CPT | Performed by: FAMILY MEDICINE

## 2024-07-22 PROCEDURE — 85018 HEMOGLOBIN: CPT | Performed by: FAMILY MEDICINE

## 2024-07-22 RX ORDER — HYDROXYZINE HYDROCHLORIDE 10 MG/1
10 TABLET, FILM COATED ORAL 3 TIMES DAILY PRN
COMMUNITY

## 2024-07-22 RX ORDER — ONDANSETRON 4 MG/1
4 TABLET, ORALLY DISINTEGRATING ORAL EVERY 8 HOURS PRN
Qty: 12 TABLET | Refills: 0 | Status: SHIPPED | OUTPATIENT
Start: 2024-07-22

## 2024-07-22 NOTE — ASSESSMENT & PLAN NOTE
Diet and hydration discussed. Will get stool samples to determine next steps. Warning signs and symptoms discussed for return to clinic or ER.

## 2024-07-22 NOTE — PROGRESS NOTES
Assessment & Plan   Problem List Items Addressed This Visit       Diarrhea, unspecified type - Primary     Diet and hydration discussed. Will get stool samples to determine next steps. Warning signs and symptoms discussed for return to clinic or ER.         Relevant Medications    ondansetron (ZOFRAN ODT) 4 MG ODT tab    Other Relevant Orders    Enteric Bacteria and Virus Panel by [facility] Stool    C. difficile Toxin B PCR with reflex to C. difficile Antigen and Toxins A/B EIA    Fecal Lactoferrin    Hemoglobin             See Patient Instructions      Rafaela Sy is a 44 year old, presenting for the following health issues:  Abdominal Pain (Upper x1 week with Diarrhea/Dizziness/Headache)        7/22/2024     9:52 AM   Additional Questions   Roomed by CHASTITY Andersen   Accompanied by Mother         7/22/2024     9:52 AM   Patient Reported Additional Medications   Patient reports taking the following new medications N/A     Diarrhea for the past week. Diffuse abd cramping which is relieved with bowel movement. No blood in stool. No Melena. No fever or chills. Started the day after having a shake and patient expected some cramping and loose stool as she is lactose intolerant, but when she does that it is normally only for a few hours and then back to normal. 5 stools, liquid last night, and had 3 more this morning. Tolerating fluids. No appetite. Mild nausea but no vomiting. No one around her has been sick. Of note, history of gastric bypass. Effexor recently increased, but that was 4 weeks ago and she had no side effects in the first week of the change. Her mother present today, and though the patient hasn't noticed a dramatic difference with the change in dose, her mother has noticed some benefits and a little less mood lability.    History of Present Illness       Reason for visit:  Diarrhea,abdominal pain,dizziness  Symptom onset:  3-7 days ago  Symptoms include:  Diarrhea,abdominal pain,dizziness  Symptom intensity:   "Moderate  Symptom progression:  Staying the same  Had these symptoms before:  No    She eats 0-1 servings of fruits and vegetables daily.She consumes 1 sweetened beverage(s) daily.She exercises with enough effort to increase her heart rate 10 to 19 minutes per day.  She exercises with enough effort to increase her heart rate 3 or less days per week.   She is taking medications regularly.         Objective    BP 99/63 (BP Location: Left arm, Patient Position: Sitting, Cuff Size: Adult Large)   Pulse 80   Temp 98.7  F (37.1  C) (Oral)   Resp 12   Ht 1.632 m (5' 4.25\")   Wt 73.8 kg (162 lb 9.6 oz)   LMP 07/12/2024 (Exact Date)   SpO2 95%   Breastfeeding No   BMI 27.69 kg/m    Body mass index is 27.69 kg/m .  Physical Exam  Vitals and nursing note reviewed.   Constitutional:       General: She is not in acute distress.     Appearance: Normal appearance. She is not ill-appearing.   HENT:      Head: Normocephalic and atraumatic.      Right Ear: Tympanic membrane, ear canal and external ear normal.      Left Ear: Tympanic membrane, ear canal and external ear normal.   Eyes:      Extraocular Movements: Extraocular movements intact.      Conjunctiva/sclera: Conjunctivae normal.   Pulmonary:      Effort: Pulmonary effort is normal.      Breath sounds: Normal breath sounds.   Abdominal:      General: Bowel sounds are normal.      Palpations: Abdomen is soft.      Tenderness: There is no right CVA tenderness or left CVA tenderness.      Comments: Diffuse mild tenderness, no point tenderness. No guarding or rebound. No HSM. No masses palpated.   Neurological:      Mental Status: She is alert and oriented to person, place, and time.   Psychiatric:         Attention and Perception: Attention normal.         Mood and Affect: Mood normal.         Speech: Speech normal.         Thought Content: Thought content normal.              Signed Electronically by: Kar Milan,     "

## 2025-05-19 ENCOUNTER — PATIENT OUTREACH (OUTPATIENT)
Dept: CARE COORDINATION | Facility: CLINIC | Age: 45
End: 2025-05-19
Payer: COMMERCIAL

## 2025-05-28 DIAGNOSIS — Z98.84 HISTORY OF ROUX-EN-Y GASTRIC BYPASS: ICD-10-CM

## 2025-05-28 RX ORDER — OMEPRAZOLE 40 MG/1
40 CAPSULE, DELAYED RELEASE ORAL DAILY
Qty: 90 CAPSULE | Refills: 3 | Status: SHIPPED | OUTPATIENT
Start: 2025-05-28

## 2025-06-17 DIAGNOSIS — F33.0 MILD EPISODE OF RECURRENT MAJOR DEPRESSIVE DISORDER: ICD-10-CM

## 2025-06-17 RX ORDER — VENLAFAXINE HYDROCHLORIDE 75 MG/1
75 CAPSULE, EXTENDED RELEASE ORAL DAILY
Qty: 90 CAPSULE | Refills: 0 | Status: SHIPPED | OUTPATIENT
Start: 2025-06-17

## 2025-07-20 ENCOUNTER — HEALTH MAINTENANCE LETTER (OUTPATIENT)
Age: 45
End: 2025-07-20

## 2025-08-12 ENCOUNTER — TELEPHONE (OUTPATIENT)
Dept: FAMILY MEDICINE | Facility: CLINIC | Age: 45
End: 2025-08-12
Payer: COMMERCIAL